# Patient Record
Sex: MALE | Race: WHITE | NOT HISPANIC OR LATINO | Employment: UNEMPLOYED | URBAN - METROPOLITAN AREA
[De-identification: names, ages, dates, MRNs, and addresses within clinical notes are randomized per-mention and may not be internally consistent; named-entity substitution may affect disease eponyms.]

---

## 2017-10-30 ENCOUNTER — HOSPITAL ENCOUNTER (INPATIENT)
Facility: HOSPITAL | Age: 47
LOS: 4 days | Discharge: OTHER INSTITUTION | DRG: 897 | End: 2017-11-03
Attending: EMERGENCY MEDICINE | Admitting: INTERNAL MEDICINE
Payer: COMMERCIAL

## 2017-10-30 DIAGNOSIS — F99 PSYCHIATRIC DISORDER: ICD-10-CM

## 2017-10-30 DIAGNOSIS — F10.239 ALCOHOL WITHDRAWAL (HCC): Primary | ICD-10-CM

## 2017-10-30 DIAGNOSIS — R56.9 WITHDRAWAL SEIZURES (HCC): ICD-10-CM

## 2017-10-30 DIAGNOSIS — F19.239 WITHDRAWAL SEIZURES (HCC): ICD-10-CM

## 2017-10-30 PROBLEM — Z92.89 S/P ALCOHOL DETOXIFICATION: Status: ACTIVE | Noted: 2017-10-30

## 2017-10-30 LAB
ALBUMIN SERPL BCP-MCNC: 3.9 G/DL (ref 3.5–5)
ALP SERPL-CCNC: 48 U/L (ref 46–116)
ALT SERPL W P-5'-P-CCNC: 43 U/L (ref 12–78)
ANION GAP SERPL CALCULATED.3IONS-SCNC: 11 MMOL/L (ref 4–13)
AST SERPL W P-5'-P-CCNC: 38 U/L (ref 5–45)
BASOPHILS # BLD AUTO: 0.02 THOUSANDS/ΜL (ref 0–0.1)
BASOPHILS NFR BLD AUTO: 1 % (ref 0–1)
BILIRUB SERPL-MCNC: 0.3 MG/DL (ref 0.2–1)
BUN SERPL-MCNC: 8 MG/DL (ref 5–25)
CALCIUM SERPL-MCNC: 8.4 MG/DL (ref 8.3–10.1)
CHLORIDE SERPL-SCNC: 107 MMOL/L (ref 100–108)
CO2 SERPL-SCNC: 24 MMOL/L (ref 21–32)
CREAT SERPL-MCNC: 0.69 MG/DL (ref 0.6–1.3)
EOSINOPHIL # BLD AUTO: 0.04 THOUSAND/ΜL (ref 0–0.61)
EOSINOPHIL NFR BLD AUTO: 1 % (ref 0–6)
ERYTHROCYTE [DISTWIDTH] IN BLOOD BY AUTOMATED COUNT: 14.3 % (ref 11.6–15.1)
ETHANOL EXG-MCNC: 0.14 MG/DL
ETHANOL SERPL-MCNC: 348 MG/DL (ref 0–3)
GFR SERPL CREATININE-BSD FRML MDRD: 112 ML/MIN/1.73SQ M
GLUCOSE SERPL-MCNC: 158 MG/DL (ref 65–140)
HCT VFR BLD AUTO: 41.2 % (ref 36.5–49.3)
HGB BLD-MCNC: 14.5 G/DL (ref 12–17)
LYMPHOCYTES # BLD AUTO: 0.87 THOUSANDS/ΜL (ref 0.6–4.47)
LYMPHOCYTES NFR BLD AUTO: 24 % (ref 14–44)
MAGNESIUM SERPL-MCNC: 2.1 MG/DL (ref 1.6–2.6)
MCH RBC QN AUTO: 30.9 PG (ref 26.8–34.3)
MCHC RBC AUTO-ENTMCNC: 35.2 G/DL (ref 31.4–37.4)
MCV RBC AUTO: 88 FL (ref 82–98)
MONOCYTES # BLD AUTO: 0.47 THOUSAND/ΜL (ref 0.17–1.22)
MONOCYTES NFR BLD AUTO: 13 % (ref 4–12)
NEUTROPHILS # BLD AUTO: 2.24 THOUSANDS/ΜL (ref 1.85–7.62)
NEUTS SEG NFR BLD AUTO: 61 % (ref 43–75)
PLATELET # BLD AUTO: 111 THOUSANDS/UL (ref 149–390)
PMV BLD AUTO: 8.9 FL (ref 8.9–12.7)
POTASSIUM SERPL-SCNC: 3.3 MMOL/L (ref 3.5–5.3)
PROT SERPL-MCNC: 7.2 G/DL (ref 6.4–8.2)
RBC # BLD AUTO: 4.69 MILLION/UL (ref 3.88–5.62)
SODIUM SERPL-SCNC: 142 MMOL/L (ref 136–145)
WBC # BLD AUTO: 3.64 THOUSAND/UL (ref 4.31–10.16)

## 2017-10-30 PROCEDURE — 36415 COLL VENOUS BLD VENIPUNCTURE: CPT | Performed by: PHYSICIAN ASSISTANT

## 2017-10-30 PROCEDURE — 96360 HYDRATION IV INFUSION INIT: CPT

## 2017-10-30 PROCEDURE — 80320 DRUG SCREEN QUANTALCOHOLS: CPT | Performed by: PHYSICIAN ASSISTANT

## 2017-10-30 PROCEDURE — 80053 COMPREHEN METABOLIC PANEL: CPT | Performed by: PHYSICIAN ASSISTANT

## 2017-10-30 PROCEDURE — 96361 HYDRATE IV INFUSION ADD-ON: CPT

## 2017-10-30 PROCEDURE — 83735 ASSAY OF MAGNESIUM: CPT | Performed by: PHYSICIAN ASSISTANT

## 2017-10-30 PROCEDURE — 82075 ASSAY OF BREATH ETHANOL: CPT | Performed by: PHYSICIAN ASSISTANT

## 2017-10-30 PROCEDURE — 85025 COMPLETE CBC W/AUTO DIFF WBC: CPT | Performed by: PHYSICIAN ASSISTANT

## 2017-10-30 RX ORDER — CHLORDIAZEPOXIDE HYDROCHLORIDE 25 MG/1
25 CAPSULE, GELATIN COATED ORAL ONCE
Status: COMPLETED | OUTPATIENT
Start: 2017-10-30 | End: 2017-10-30

## 2017-10-30 RX ORDER — GABAPENTIN 100 MG/1
100 CAPSULE ORAL DAILY
COMMUNITY
End: 2018-06-16 | Stop reason: ALTCHOICE

## 2017-10-30 RX ORDER — TRAZODONE HYDROCHLORIDE 50 MG/1
50 TABLET ORAL
COMMUNITY
End: 2018-06-16 | Stop reason: ALTCHOICE

## 2017-10-30 RX ORDER — TOPIRAMATE 50 MG/1
25 TABLET, FILM COATED ORAL 2 TIMES DAILY
COMMUNITY
End: 2018-06-16 | Stop reason: ALTCHOICE

## 2017-10-30 RX ADMIN — CHLORDIAZEPOXIDE HYDROCHLORIDE 25 MG: 25 CAPSULE ORAL at 16:26

## 2017-10-30 RX ADMIN — SODIUM CHLORIDE 1000 ML: 0.9 INJECTION, SOLUTION INTRAVENOUS at 16:48

## 2017-10-30 NOTE — ED PROVIDER NOTES
History  Chief Complaint   Patient presents with    Detox Evaluation     Patient presents to the ED requesting tx for alcohol withdraw  States that he drinks a 5th of whiskey daily x28 years  Has been in treatment 3 times and has a hx of alcohol withdraw seizure  States that he has been alcohol free x7 hours  Denies SI or HI at this time     48 yo male presents for evaluation for alcohol withdraw  Patient has a long history of alcohol abuse  He has been admitted in the past for detox  He is now here wishing to do the same  He has been going through a lot with his life and states that he couldn't handle it all  He started drinking again over a week ago  He states he drinks a half a fifth of whiskey daily  Last drink over 24 hours ago (per nurse it was 7 hours ago?)  He actually is feeling ok at this time  Denies any symptoms such as headache, nausea, dizziness, lightheadedness, chest pain, SOB, abd pain, diarrhea, urinary symptoms  He does report a history of seizures secondary to withdraw  Denies any delusions or hallucinations  He denies any IV drug use, alcohol abuse  Prior to Admission Medications   Prescriptions Last Dose Informant Patient Reported? Taking?   gabapentin (NEURONTIN) 100 mg capsule   Yes Yes   Sig: Take 100 mg by mouth daily   sertraline (ZOLOFT) 50 mg tablet   Yes Yes   Sig: Take 50 mg by mouth daily   topiramate (TOPAMAX) 50 MG tablet   Yes Yes   Sig: Take 25 mg by mouth 2 (two) times a day   traZODone (DESYREL) 50 mg tablet   Yes Yes   Sig: Take 50 mg by mouth daily at bedtime      Facility-Administered Medications: None       Past Medical History:   Diagnosis Date    Alcohol abuse     Alcohol withdrawal seizure ([address] )     Insomnia     Psychiatric disorder        Past Surgical History:   Procedure Laterality Date    SKIN GRAFT      WISDOM TOOTH EXTRACTION         History reviewed  No pertinent family history    I have reviewed and agree with the history as documented  Social History   Substance Use Topics    Smoking status: Never Smoker    Smokeless tobacco: Never Used    Alcohol use Yes      Comment: 5th omarey daily         Review of Systems   Constitutional: Negative for activity change, appetite change, chills, fatigue and fever  HENT: Negative for congestion, postnasal drip, rhinorrhea, sore throat, trouble swallowing and voice change  Eyes: Negative for photophobia and visual disturbance  Respiratory: Negative for cough and shortness of breath  Cardiovascular: Negative for chest pain  Gastrointestinal: Negative for abdominal pain, constipation, diarrhea, nausea and vomiting  Endocrine: Negative for cold intolerance and heat intolerance  Genitourinary: Negative for dysuria, flank pain, frequency, hematuria and urgency  Musculoskeletal: Negative for back pain, gait problem, neck pain and neck stiffness  Skin: Negative for rash and wound  Allergic/Immunologic: Negative for food allergies  Neurological: Positive for tremors  Negative for dizziness, seizures, syncope, speech difficulty, weakness, light-headedness, numbness and headaches  Hematological: Negative for adenopathy  Physical Exam  ED Triage Vitals [10/30/17 1535]   Temperature Pulse Respirations Blood Pressure SpO2   98 4 °F (36 9 °C) 89 18 143/83 100 %      Temp Source Heart Rate Source Patient Position - Orthostatic VS BP Location FiO2 (%)   Temporal Monitor Sitting Right arm --      Pain Score       No Pain           Orthostatic Vital Signs  Vitals:    10/31/17 0120 10/31/17 0400 10/31/17 0611 10/31/17 0900   BP: 125/76 124/82 127/86 164/93   Pulse: 65 92 76 65   Patient Position - Orthostatic VS: Lying          Physical Exam   Constitutional: He is oriented to person, place, and time  He appears well-developed and well-nourished  No distress  HENT:   Head: Normocephalic and atraumatic     Eyes: Conjunctivae and EOM are normal  Pupils are equal, round, and reactive to light  Neck: Normal range of motion  Neck supple  Cardiovascular: Normal rate, regular rhythm, normal heart sounds and intact distal pulses  Exam reveals no gallop and no friction rub  No murmur heard  Pulmonary/Chest: Effort normal and breath sounds normal  No respiratory distress  He has no wheezes  He has no rales  Musculoskeletal: Normal range of motion  He exhibits no edema, tenderness or deformity  Neurological: He is alert and oriented to person, place, and time  He has normal strength  He displays tremor  No cranial nerve deficit or sensory deficit  GCS eye subscore is 4  GCS verbal subscore is 5  GCS motor subscore is 6  Normal pupillary response  No nystagmus   Skin: Skin is warm  He is diaphoretic  No pallor  Psychiatric: He has a normal mood and affect  His behavior is normal  Judgment and thought content normal    Vitals reviewed        ED Medications  Medications   gabapentin (NEURONTIN) capsule 100 mg (100 mg Oral Given 10/31/17 0948)   sertraline (ZOLOFT) tablet 50 mg (50 mg Oral Given 10/31/17 0949)   topiramate (TOPAMAX) tablet 25 mg (25 mg Oral Given 10/31/17 0950)   traZODone (DESYREL) tablet 50 mg (50 mg Oral Given 10/31/17 0149)   ondansetron (ZOFRAN) injection 4 mg (not administered)   acetaminophen (TYLENOL) tablet 650 mg (not administered)   multivitamin-minerals (CENTRUM) tablet 1 tablet (1 tablet Oral Given 49/98/57 4927)   folic acid (FOLVITE) tablet 1 mg (1 mg Oral Given 10/31/17 0948)   chlordiazePOXIDE (LIBRIUM) capsule 25 mg (25 mg Oral Given 10/31/17 0608)   sodium chloride 0 9 % infusion (125 mL/hr Intravenous New Bag 10/31/17 0943)   thiamine (VITAMIN B1) 100 mg in sodium chloride 0 9 % 50 mL IVPB (not administered)   LORazepam (ATIVAN) 2 mg/mL injection 2 mg (2 mg Intravenous Given 10/31/17 0954)   potassium chloride (K-DUR,KLOR-CON) CR tablet 40 mEq (40 mEq Oral Given 10/31/17 1005)   heparin (porcine) subcutaneous injection 5,000 Units (not administered)   sodium chloride 0 9 % bolus 1,000 mL (0 mL Intravenous Stopped 10/31/17 0047)   chlordiazePOXIDE (LIBRIUM) capsule 25 mg (25 mg Oral Given 10/30/17 1626)   potassium chloride (K-DUR,KLOR-CON) CR tablet 40 mEq (40 mEq Oral Given 10/31/17 0300)       Diagnostic Studies  Results Reviewed     Procedure Component Value Units Date/Time    POCT alcohol breath test [[id number]]  (Normal) Resulted:  10/30/17 2150    Lab Status:  Final result Updated:  10/30/17 2150     EXTBreath Alcohol 0 143    Ethanol [[id number]]  (Abnormal) Collected:  10/30/17 1640    Lab Status:  Final result Specimen:  Blood from Arm, Left Updated:  10/30/17 1728     Ethanol Lvl 348 (H) mg/dL     Comprehensive metabolic panel [[id number]]  (Abnormal) Collected:  10/30/17 1640    Lab Status:  Final result Specimen:  Blood from Arm, Left Updated:  10/30/17 1719     Sodium 142 mmol/L      Potassium 3 3 (L) mmol/L      Chloride 107 mmol/L      CO2 24 mmol/L      Anion Gap 11 mmol/L      BUN 8 mg/dL      Creatinine 0 69 mg/dL      Glucose 158 (H) mg/dL      Calcium 8 4 mg/dL      AST 38 U/L      ALT 43 U/L      Alkaline Phosphatase 48 U/L      Total Protein 7 2 g/dL      Albumin 3 9 g/dL      Total Bilirubin 0 30 mg/dL      eGFR 112 ml/min/1 73sq m     Narrative:         National Kidney Disease Education Program recommendations are as follows:  GFR calculation is accurate only with a steady state creatinine  Chronic Kidney disease less than 60 ml/min/1 73 sq  meters  Kidney failure less than 15 ml/min/1 73 sq  meters      Magnesium [[id number]]  (Normal) Collected:  10/30/17 1640    Lab Status:  Final result Specimen:  Blood from Arm, Left Updated:  10/30/17 1719     Magnesium 2 1 mg/dL     CBC and differential [[id number]]  (Abnormal) Collected:  10/30/17 1640    Lab Status:  Final result Specimen:  Blood from Arm, Left Updated:  10/30/17 1702     WBC 3 64 (L) Thousand/uL      RBC 4 69 Million/uL      Hemoglobin 14 5 g/dL      Hematocrit 41 2 % MCV 88 fL      MCH 30 9 pg      MCHC 35 2 g/dL      RDW 14 3 %      MPV 8 9 fL      Platelets 184 (L) Thousands/uL      Neutrophils Relative 61 %      Lymphocytes Relative 24 %      Monocytes Relative 13 (H) %      Eosinophils Relative 1 %      Basophils Relative 1 %      Neutrophils Absolute 2 24 Thousands/µL      Lymphocytes Absolute 0 87 Thousands/µL      Monocytes Absolute 0 47 Thousand/µL      Eosinophils Absolute 0 04 Thousand/µL      Basophils Absolute 0 02 Thousands/µL                  No orders to display              Procedures  Procedures       Phone Contacts  ED Phone Contact    ED Course  ED Course as of Oct 31 1038   Mon Oct 30, 2017   1747 MEDICAL ALCOHOL: (!) 348   1811 Needs to have recheck at 23:00 MEDICAL ALCOHOL: (!) 348                               MDM  Number of Diagnoses or Management Options  Alcohol withdrawal ([place] [address] ): Withdrawal seizures [facility]):   Diagnosis management comments: At this time patient presenting for desire to withdrawal from alcohol  He is currently intoxicated  I will obtain a routine workup and monitor the patient until he has reached sobriety and at that time if he is still wanting to detox the plan is to admit due to history of seizures  ** Pt signed out to Dr Sukumar Yip at 22:30 on 10/30/17   Pending sobriety        Amount and/or Complexity of Data Reviewed  Clinical lab tests: ordered and reviewed  Review and summarize past medical records: yes  Discuss the patient with other providers: yes  Independent visualization of images, tracings, or specimens: yes      CritCare Time    Disposition  Final diagnoses:   Alcohol withdrawal ([place] [address] )   Withdrawal seizures ([place] [address] ) - History of withdrawal seizures     Time reflects when diagnosis was documented in both MDM as applicable and the Disposition within this note     Time User Action Codes Description Comment    10/30/2017 10:38 PM Akilah Og Add [F10 239] Alcohol withdrawal ([facility] )     10/30/2017 10:39 PM Akilah Og Add [U74 332,  R56 9] Withdrawal seizures ([facility] [address] )     10/30/2017 10:39 PM Othelia Nails Modify [V24 092,  R56 9] Withdrawal seizures ([facility] ) History of withdrawal seizures    10/31/2017  7:03 AM Ayush Pierson Add [F99] Psychiatric disorder       ED Disposition     ED Disposition Condition Comment    Admit  Case was discussed with Clayton Perez and the patient's admission status was agreed to be Admission Status: inpatient status to the service of Dr Rik Houston          Follow-up Information    None       Current Discharge Medication List      CONTINUE these medications which have NOT CHANGED    Details   gabapentin (NEURONTIN) 100 mg capsule Take 100 mg by mouth daily      sertraline (ZOLOFT) 50 mg tablet Take 50 mg by mouth daily      topiramate (TOPAMAX) 50 MG tablet Take 25 mg by mouth 2 (two) times a day      traZODone (DESYREL) 50 mg tablet Take 50 mg by mouth daily at bedtime           No discharge procedures on file      ED Provider  Electronically Signed by           Chantel Plaza PA-C  10/31/17 1038

## 2017-10-31 LAB
ANION GAP SERPL CALCULATED.3IONS-SCNC: 12 MMOL/L (ref 4–13)
BUN SERPL-MCNC: 8 MG/DL (ref 5–25)
CALCIUM SERPL-MCNC: 8 MG/DL (ref 8.3–10.1)
CHLORIDE SERPL-SCNC: 103 MMOL/L (ref 100–108)
CO2 SERPL-SCNC: 24 MMOL/L (ref 21–32)
CREAT SERPL-MCNC: 0.68 MG/DL (ref 0.6–1.3)
ERYTHROCYTE [DISTWIDTH] IN BLOOD BY AUTOMATED COUNT: 14.6 % (ref 11.6–15.1)
GFR SERPL CREATININE-BSD FRML MDRD: 113 ML/MIN/1.73SQ M
GLUCOSE SERPL-MCNC: 109 MG/DL (ref 65–140)
HCT VFR BLD AUTO: 39.4 % (ref 36.5–49.3)
HGB BLD-MCNC: 13.5 G/DL (ref 12–17)
MCH RBC QN AUTO: 30.1 PG (ref 26.8–34.3)
MCHC RBC AUTO-ENTMCNC: 34.3 G/DL (ref 31.4–37.4)
MCV RBC AUTO: 88 FL (ref 82–98)
PLATELET # BLD AUTO: 100 THOUSANDS/UL (ref 149–390)
PMV BLD AUTO: 9.2 FL (ref 8.9–12.7)
POTASSIUM SERPL-SCNC: 3.3 MMOL/L (ref 3.5–5.3)
RBC # BLD AUTO: 4.48 MILLION/UL (ref 3.88–5.62)
SODIUM SERPL-SCNC: 139 MMOL/L (ref 136–145)
WBC # BLD AUTO: 2.69 THOUSAND/UL (ref 4.31–10.16)

## 2017-10-31 PROCEDURE — 99285 EMERGENCY DEPT VISIT HI MDM: CPT

## 2017-10-31 PROCEDURE — 80048 BASIC METABOLIC PNL TOTAL CA: CPT | Performed by: NURSE PRACTITIONER

## 2017-10-31 PROCEDURE — 85027 COMPLETE CBC AUTOMATED: CPT | Performed by: NURSE PRACTITIONER

## 2017-10-31 RX ORDER — LORAZEPAM 2 MG/ML
2 INJECTION INTRAMUSCULAR EVERY 2 HOUR PRN
Status: DISCONTINUED | OUTPATIENT
Start: 2017-10-31 | End: 2017-11-03 | Stop reason: HOSPADM

## 2017-10-31 RX ORDER — TOPIRAMATE 25 MG/1
25 TABLET ORAL 2 TIMES DAILY
Status: DISCONTINUED | OUTPATIENT
Start: 2017-10-31 | End: 2017-11-03 | Stop reason: HOSPADM

## 2017-10-31 RX ORDER — CHLORDIAZEPOXIDE HYDROCHLORIDE 25 MG/1
25 CAPSULE, GELATIN COATED ORAL EVERY 6 HOURS SCHEDULED
Status: DISCONTINUED | OUTPATIENT
Start: 2017-10-31 | End: 2017-11-02

## 2017-10-31 RX ORDER — SODIUM CHLORIDE 9 MG/ML
75 INJECTION, SOLUTION INTRAVENOUS CONTINUOUS
Status: DISCONTINUED | OUTPATIENT
Start: 2017-10-31 | End: 2017-11-03

## 2017-10-31 RX ORDER — FOLIC ACID 1 MG/1
1 TABLET ORAL DAILY
Status: DISCONTINUED | OUTPATIENT
Start: 2017-10-31 | End: 2017-11-03 | Stop reason: HOSPADM

## 2017-10-31 RX ORDER — HEPARIN SODIUM 5000 [USP'U]/ML
5000 INJECTION, SOLUTION INTRAVENOUS; SUBCUTANEOUS EVERY 8 HOURS SCHEDULED
Status: DISCONTINUED | OUTPATIENT
Start: 2017-10-31 | End: 2017-11-03 | Stop reason: HOSPADM

## 2017-10-31 RX ORDER — LORAZEPAM 2 MG/ML
2 INJECTION INTRAMUSCULAR EVERY 6 HOURS PRN
Status: DISCONTINUED | OUTPATIENT
Start: 2017-10-31 | End: 2017-10-31

## 2017-10-31 RX ORDER — POTASSIUM CHLORIDE 20 MEQ/1
40 TABLET, EXTENDED RELEASE ORAL EVERY 4 HOURS
Status: COMPLETED | OUTPATIENT
Start: 2017-10-31 | End: 2017-10-31

## 2017-10-31 RX ORDER — TRAZODONE HYDROCHLORIDE 50 MG/1
50 TABLET ORAL
Status: DISCONTINUED | OUTPATIENT
Start: 2017-10-31 | End: 2017-11-03 | Stop reason: HOSPADM

## 2017-10-31 RX ORDER — GABAPENTIN 100 MG/1
100 CAPSULE ORAL DAILY
Status: DISCONTINUED | OUTPATIENT
Start: 2017-10-31 | End: 2017-11-03 | Stop reason: HOSPADM

## 2017-10-31 RX ORDER — POTASSIUM CHLORIDE 20 MEQ/1
40 TABLET, EXTENDED RELEASE ORAL ONCE
Status: COMPLETED | OUTPATIENT
Start: 2017-10-31 | End: 2017-10-31

## 2017-10-31 RX ORDER — ACETAMINOPHEN 325 MG/1
650 TABLET ORAL EVERY 6 HOURS PRN
Status: DISCONTINUED | OUTPATIENT
Start: 2017-10-31 | End: 2017-11-03 | Stop reason: HOSPADM

## 2017-10-31 RX ORDER — ONDANSETRON 2 MG/ML
4 INJECTION INTRAMUSCULAR; INTRAVENOUS EVERY 6 HOURS PRN
Status: DISCONTINUED | OUTPATIENT
Start: 2017-10-31 | End: 2017-11-03 | Stop reason: HOSPADM

## 2017-10-31 RX ORDER — THIAMINE MONONITRATE (VIT B1) 100 MG
100 TABLET ORAL DAILY
Status: DISCONTINUED | OUTPATIENT
Start: 2017-10-31 | End: 2017-10-31

## 2017-10-31 RX ADMIN — TRAZODONE HYDROCHLORIDE 50 MG: 50 TABLET ORAL at 21:37

## 2017-10-31 RX ADMIN — SODIUM CHLORIDE 125 ML/HR: 0.9 INJECTION, SOLUTION INTRAVENOUS at 09:43

## 2017-10-31 RX ADMIN — POTASSIUM CHLORIDE 40 MEQ: 1500 TABLET, EXTENDED RELEASE ORAL at 03:00

## 2017-10-31 RX ADMIN — LORAZEPAM 2 MG: 2 INJECTION, SOLUTION INTRAMUSCULAR; INTRAVENOUS at 15:12

## 2017-10-31 RX ADMIN — TRAZODONE HYDROCHLORIDE 50 MG: 50 TABLET ORAL at 01:49

## 2017-10-31 RX ADMIN — Medication 1 TABLET: at 09:46

## 2017-10-31 RX ADMIN — LORAZEPAM 2 MG: 2 INJECTION, SOLUTION INTRAMUSCULAR; INTRAVENOUS at 21:37

## 2017-10-31 RX ADMIN — POTASSIUM CHLORIDE 40 MEQ: 1500 TABLET, EXTENDED RELEASE ORAL at 10:05

## 2017-10-31 RX ADMIN — TOPIRAMATE 25 MG: 25 TABLET, FILM COATED ORAL at 17:43

## 2017-10-31 RX ADMIN — TOPIRAMATE 25 MG: 25 TABLET, FILM COATED ORAL at 09:50

## 2017-10-31 RX ADMIN — THIAMINE HYDROCHLORIDE 100 MG: 100 INJECTION, SOLUTION INTRAMUSCULAR; INTRAVENOUS at 11:25

## 2017-10-31 RX ADMIN — SERTRALINE HYDROCHLORIDE 50 MG: 50 TABLET ORAL at 09:49

## 2017-10-31 RX ADMIN — CHLORDIAZEPOXIDE HYDROCHLORIDE 25 MG: 25 CAPSULE ORAL at 06:08

## 2017-10-31 RX ADMIN — POTASSIUM CHLORIDE 40 MEQ: 1500 TABLET, EXTENDED RELEASE ORAL at 13:16

## 2017-10-31 RX ADMIN — CHLORDIAZEPOXIDE HYDROCHLORIDE 25 MG: 25 CAPSULE ORAL at 11:26

## 2017-10-31 RX ADMIN — HEPARIN SODIUM 5000 UNITS: 5000 INJECTION, SOLUTION INTRAVENOUS; SUBCUTANEOUS at 13:16

## 2017-10-31 RX ADMIN — LORAZEPAM 2 MG: 2 INJECTION, SOLUTION INTRAMUSCULAR; INTRAVENOUS at 09:54

## 2017-10-31 RX ADMIN — GABAPENTIN 100 MG: 100 CAPSULE ORAL at 09:48

## 2017-10-31 RX ADMIN — FOLIC ACID 1 MG: 1 TABLET ORAL at 09:48

## 2017-10-31 RX ADMIN — SODIUM CHLORIDE 125 ML/HR: 0.9 INJECTION, SOLUTION INTRAVENOUS at 17:34

## 2017-10-31 RX ADMIN — HEPARIN SODIUM 5000 UNITS: 5000 INJECTION, SOLUTION INTRAVENOUS; SUBCUTANEOUS at 21:37

## 2017-10-31 RX ADMIN — CHLORDIAZEPOXIDE HYDROCHLORIDE 25 MG: 25 CAPSULE ORAL at 01:49

## 2017-10-31 RX ADMIN — CHLORDIAZEPOXIDE HYDROCHLORIDE 25 MG: 25 CAPSULE ORAL at 17:43

## 2017-10-31 NOTE — PLAN OF CARE
DISCHARGE PLANNING     Discharge to home or other facility with appropriate resources Progressing        INFECTION - ADULT     Absence or prevention of progression during hospitalization Progressing        Knowledge Deficit     Patient/family/caregiver demonstrates understanding of disease process, treatment plan, medications, and discharge instructions Progressing        METABOLIC, FLUID AND ELECTROLYTES - ADULT     Electrolytes maintained within normal limits Progressing     Fluid balance maintained Progressing        NEUROSENSORY - ADULT     Achieves stable or improved neurological status Progressing     Absence of seizures Progressing     Remains free of injury related to seizures activity Progressing     Achieves maximal functionality and self care Progressing        PAIN - ADULT     Verbalizes/displays adequate comfort level or baseline comfort level Progressing        SAFETY ADULT     Patient will remain free of falls Progressing     Maintain or return to baseline ADL function Progressing     Maintain or return mobility status to optimal level Progressing

## 2017-10-31 NOTE — SOCIAL WORK
Met with patient, discussed role of Care Management  Patient reports being recently fired from his job due to his drinking  A PFA was files against him on Thursday, Oct 26, 2017 and he can not go near his wife or 4 children (ages 13-11)  He stayed 2 nights with a friend from work and was sharing a couch with someone  He stayed one night at a cousin's and can not go back there as he has young children  He is now homeless  He has been to inpatient alcohol rehab in the past, most recent a 30 day stay at [facility] in [facility] and was sober for 75 days before he started to drink a fifth of whiskey a day  He sees  Андрей Gregg at [facility] at [phone] and he thought Shayan Crawford was trying to get him into [facility] re[facility] an [place]  Placed call Gilbert and mari message re above  Will follow  Not sure if patient will have insurance as of 11/1/17 since his [id number]  [facility]  insurance was through his job and as of now he did not sign onto [employer] for insurance to continue  Tessa Camejo

## 2017-10-31 NOTE — H&P
History and Physical - [facility]    Patient Information: Lord Salas 49 y o  male MRN: [id number]  Unit/Bed#: ED 08 Encounter: [id number]  Admitting Physician: STEPHANIE Boudreaux  PCP: Maggie Fischer DO  Date of Admission:  10/31/17    Assessment/Plan:    Hospital Problem List:     Principal Problem:    S/P alcohol detoxification  Active Problems:    Psychiatric disorder    Alcohol withdrawal seizure ([facility] )    Alcohol abuse      Plan for the Primary Problem(s):  · S/P Alcohol detox with history of alcohol withdrawal seizure:  · Initiate CIWA protocol  · Librium 25 mg q 6 hours and Ativan 2 mg IV p r n  · Thiamine, folic acid, and multivitamin  · Continuous cardiopulmonary monitoring  · Consult case management for possible alcohol rehab at time of discharge    Plan for Additional Problems:   · Psychiatric disorder:  Continue sertraline, Neurontin, and trazodone  VTE Prophylaxis: Patient is low risk for VT  / sequential compression device   Code Status:  Full code  POLST: There is no POLST form on file for this patient (pre-hospital)    Anticipated Length of Stay:  Patient will be admitted on an Inpatient basis with an anticipated length of stay of  > 2 midnights  Justification for Hospital Stay:  IV medication    Total Time for Visit, including Counseling / Coordination of Care: 30 minutes  Greater than 50% of this total time spent on direct patient counseling and coordination of care  Chief Complaint:   Alcohol detox     History of Present Illness:    Lord Salas is a 49 y o  male with history of alcohol abuse and alcohol withdrawal seizures who presents with request for treatment for alcohol withdrawal   Patient states that he got out of drug and alcohol rehab around July 4th  He was sober for 75 days and had been going to [facility] twice a day  Patient states that his wife drinks and takes Xanax and from being around her he relapsed    Patient states he has been drinking approximately 1/5 of whiskey per day for the past 3 weeks with his last drink being Monday morning  Patient admits to history of withdrawal seizures  Denies fevers chills  No nausea, vomiting, or diarrhea  No lightheadedness or dizziness  Denies delusions or hallucinations  Review of Systems:    Review of Systems   Constitutional: Positive for activity change  Negative for appetite change, chills and fever  Respiratory: Negative for apnea, cough and shortness of breath  Cardiovascular: Negative for chest pain, palpitations and leg swelling  Gastrointestinal: Negative for abdominal distention, abdominal pain, constipation, diarrhea, nausea and vomiting  Genitourinary: Negative for dysuria  Neurological: Positive for tremors  Negative for dizziness, seizures, facial asymmetry, weakness, light-headedness, numbness and headaches  Psychiatric/Behavioral: Negative for agitation and confusion  The patient is not nervous/anxious  All other systems reviewed and are negative  Past Medical and Surgical History:     Past Medical History:   Diagnosis Date    Alcohol abuse     Alcohol withdrawal seizure ([facility] [address] )     Insomnia     Psychiatric disorder        Past Surgical History:   Procedure Laterality Date    SKIN GRAFT      WISDOM TOOTH EXTRACTION         Meds/Allergies:    Prior to Admission medications    Medication Sig Start Date End Date Taking? Authorizing Provider   gabapentin (NEURONTIN) 100 mg capsule Take 100 mg by mouth daily   Yes Historical Provider, MD   sertraline (ZOLOFT) 50 mg tablet Take 50 mg by mouth daily   Yes Historical Provider, MD   topiramate (TOPAMAX) 50 MG tablet Take 25 mg by mouth 2 (two) times a day   Yes Historical Provider, MD   traZODone (DESYREL) 50 mg tablet Take 50 mg by mouth daily at bedtime   Yes Historical Provider, MD     I have reviewed home medications with patient personally      Allergies: No Known Allergies    Social History:     Marital Status: /Civil Union   Occupation:  Unemployed  Patient Pre-hospital Living Situation:  Living in car  Patient Pre-hospital Level of Mobility:  Independent  Patient Pre-hospital Diet Restrictions:  None  Substance Use History:   History   Alcohol Use    Yes     Comment: 5th whiskey daily      History   Smoking Status    Never Smoker   Smokeless Tobacco    Never Used     History   Drug Use No       Family History:    non-contributory    Physical Exam:     Vitals:   Blood Pressure: 101/53 (10/30/17 2200)  Pulse: 62 (10/30/17 2200)  Temperature: 98 4 °F (36 9 °C) (10/30/17 1535)  Temp Source: Temporal (10/30/17 1535)  Respirations: 15 (10/30/17 2200)  Height: 5' 10" (177 8 cm) (10/30/17 1535)  Weight - Scale: 88 kg (194 lb) (10/30/17 1535)  SpO2: 95 % (10/30/17 2200)    Physical Exam   Constitutional: He is oriented to person, place, and time  He appears well-developed and well-nourished  No distress  HENT:   Head: Normocephalic and atraumatic  Eyes: EOM are normal  Pupils are equal, round, and reactive to light  Neck: Normal range of motion  Neck supple  Cardiovascular: Normal rate, regular rhythm, normal heart sounds and intact distal pulses  Exam reveals no gallop and no friction rub  No murmur heard  Pulmonary/Chest: Effort normal and breath sounds normal  No respiratory distress  He has no wheezes  He has no rales  Abdominal: Soft  Bowel sounds are normal  He exhibits no distension  There is no tenderness  Musculoskeletal: Normal range of motion  He exhibits no edema  Neurological: He is alert and oriented to person, place, and time  Skin: Skin is warm and dry  Psychiatric: He has a normal mood and affect  Judgment normal    Nursing note and vitals reviewed  Additional Data:     Lab Results: I have personally reviewed pertinent reports          Results from last 7 days  Lab Units 10/30/17  1640   WBC Thousand/uL 3 64*   HEMOGLOBIN g/dL 14 5   HEMATOCRIT % 41 2   PLATELETS Thousands/uL 111*   NEUTROS PCT % 61   LYMPHS PCT % 24   MONOS PCT % 13*   EOS PCT % 1       Results from last 7 days  Lab Units 10/30/17  1640   SODIUM mmol/L 142   POTASSIUM mmol/L 3 3*   CHLORIDE mmol/L 107   CO2 mmol/L 24   BUN mg/dL 8   CREATININE mg/dL 0 69   CALCIUM mg/dL 8 4   TOTAL PROTEIN g/dL 7 2   BILIRUBIN TOTAL mg/dL 0 30   ALK PHOS U/L 48   ALT U/L 43   AST U/L 38   GLUCOSE RANDOM mg/dL 158*             Allscripts Records Reviewed: No     ** Please Note: Dragon 360 Dictation voice to text software may have been used in the creation of this document   **

## 2017-10-31 NOTE — ED CARE HANDOFF
Emergency Department Sign Out Note        Sign out and transfer of care from [facility]  See Separate Emergency Department note  The patient, Carolina Reyes, was evaluated by the previous provider for alcohol intoxication, hx of w/d seizures and desire for rehab  Workup Completed:  Labs Reviewed   CBC AND DIFFERENTIAL - Abnormal        Result Value Ref Range Status    WBC 3 64 (*) 4 31 - 10 16 Thousand/uL Final    Platelets 918 (*) 851 - 390 Thousands/uL Final    Monocytes Relative 13 (*) 4 - 12 % Final    RBC 4 69  3 88 - 5 62 Million/uL Final    Hemoglobin 14 5  12 0 - 17 0 g/dL Final    Hematocrit 41 2  36 5 - 49 3 % Final    MCV 88  82 - 98 fL Final    MCH 30 9  26 8 - 34 3 pg Final    MCHC 35 2  31 4 - 37 4 g/dL Final    RDW 14 3  11 6 - 15 1 % Final    MPV 8 9  8 9 - 12 7 fL Final    Neutrophils Relative 61  43 - 75 % Final    Lymphocytes Relative 24  14 - 44 % Final    Eosinophils Relative 1  0 - 6 % Final    Basophils Relative 1  0 - 1 % Final    Neutrophils Absolute 2 24  1 85 - 7 62 Thousands/µL Final    Lymphocytes Absolute 0 87  0 60 - 4 47 Thousands/µL Final    Monocytes Absolute 0 47  0 17 - 1 22 Thousand/µL Final    Eosinophils Absolute 0 04  0 00 - 0 61 Thousand/µL Final    Basophils Absolute 0 02  0 00 - 0 10 Thousands/µL Final   COMPREHENSIVE METABOLIC PANEL - Abnormal     Potassium 3 3 (*) 3 5 - 5 3 mmol/L Final    Glucose 158 (*) 65 - 140 mg/dL Final    Comment:   If the patient is fasting, the ADA then defines impaired fasting glucose as > 100 mg/dL and diabetes as > or equal to 123 mg/dL  Specimen collection should occur prior to Sulfasalazine administration due to the potential for falsely depressed results  Specimen collection should occur prior to Sulfapyridine administration due to the potential for falsely elevated results      Sodium 142  136 - 145 mmol/L Final    Chloride 107  100 - 108 mmol/L Final    CO2 24  21 - 32 mmol/L Final    Anion Gap 11  4 - 13 mmol/L Final    BUN 8  5 - 25 mg/dL Final    Creatinine 0 69  0 60 - 1 30 mg/dL Final    Comment: Standardized to IDMS reference method    Calcium 8 4  8 3 - 10 1 mg/dL Final    AST 38  5 - 45 U/L Final    Comment:   Specimen collection should occur prior to Sulfasalazine administration due to the potential for falsely depressed results  ALT 43  12 - 78 U/L Final    Comment:   Specimen collection should occur prior to Sulfasalazine administration due to the potential for falsely depressed results  Alkaline Phosphatase 48  46 - 116 U/L Final    Total Protein 7 2  6 4 - 8 2 g/dL Final    Albumin 3 9  3 5 - 5 0 g/dL Final    Total Bilirubin 0 30  0 20 - 1 00 mg/dL Final    eGFR 112  ml/min/1 73sq m Final    Narrative:     National Kidney Disease Education Program recommendations are as follows:  GFR calculation is accurate only with a steady state creatinine  Chronic Kidney disease less than 60 ml/min/1 73 sq  meters  Kidney failure less than 15 ml/min/1 73 sq  meters  MEDICAL ALCOHOL - Abnormal     Ethanol Lvl 348 (*) 0 - 3 mg/dL Final    Comment: Diluted  VMA   MAGNESIUM - Normal    Magnesium 2 1  1 6 - 2 6 mg/dL Final   POCT ALCOHOL BREATH TEST - Normal    EXTBreath Alcohol 0 143   Final         ED Course / Workup Pending (followup):  Pt already tremulous at BAT of 143  Hx of w/d sz in the past - concerned for sending directly to rehab without a medical detox    Pt agreeable to admission                          ED Course      Procedures  MDM  Number of Diagnoses or Management Options  Alcohol withdrawal ([facility] ): new and requires workup  Withdrawal seizures [facility]): new and requires workup     Amount and/or Complexity of Data Reviewed  Clinical lab tests: ordered and reviewed  Discuss the patient with other providers: yes      CritCare Time      Disposition  Final diagnoses:   Alcohol withdrawal ([facility] )   Withdrawal seizures ([facility] ) - History of withdrawal seizures     Time reflects when diagnosis was documented in both MDM as applicable and the Disposition within this note     Time User Action Codes Description Comment    10/30/2017 10:38 PM Madi Quan Add [F10 239] Alcohol withdrawal ([facility] )     10/30/2017 10:39 PM Darshana Santana [U36 532,  R56 9] Withdrawal seizures ([facility] )     10/30/2017 10:39 PM Joe Rothman [F02 303,  R56 9] Withdrawal seizures ([facility] ) History of withdrawal seizures      ED Disposition     ED Disposition Condition Comment    Admit  Case was discussed with Clark Lunsford and the patient's admission status was agreed to be Admission Status: inpatient status to the service of Dr Isai Castorena          Follow-up Information    None       Current Discharge Medication List      CONTINUE these medications which have NOT CHANGED    Details   gabapentin (NEURONTIN) 100 mg capsule Take 100 mg by mouth daily      sertraline (ZOLOFT) 50 mg tablet Take 50 mg by mouth daily      topiramate (TOPAMAX) 50 MG tablet Take 25 mg by mouth 2 (two) times a day      traZODone (DESYREL) 50 mg tablet Take 50 mg by mouth daily at bedtime           No discharge procedures on file         ED Provider  Electronically Signed by

## 2017-10-31 NOTE — CONSULTS
[address] 49 y o  male MRN: [id number]  Unit/Bed#: -01 Encounter: [id number]    Assessment/Plan     Assessment:  Mood Disorder  Alcohol abuse    Plan:   1  Discharge to inpatient rehab when medically stable  2  Continue CIWA protocol  3  Continue current psychiatric medications  4  Patient does not meet criteria for inpatient psychiatric admission  Psychiatry signing off  Risks, benefits and possible side effects of Medications:   Risks, benefits, and possible side effects of medications explained to patient and patient verbalizes understanding  Chief Complaint: "I'm going through alcohol withdrawal"    History of Present Illness   Physician Requesting Consult: Amber Crowley MD  Reason for Consult / Principal Problem: "I want rehab"    Patient is a 49year old male who came to [facility] ED due to alcohol withdrawal symptoms and wanting to pursue inpatient rehab  Patient reported drinking a 5th of liquor or a 6 pack of pounders a day  Recent stressors are issues with his wife, being kicked out of the home, and becoming essentially homeless  Before admission he stopped drinking abruptly and sought medical attention due to history of withdrawal seizures  Currently has withdrawal symptoms of tremors, anxiety, and sweating  Anxiety currently less since starting Librium taper  Patient's last rehab stint was at [facility] in July where he was discharged in August   He is compliant with psychiatric medications  Currently denied SI/HI  Reports adequate sleep, appetite, energy  Denied psychosis and denied history of steve  Does not endorse manic symptoms  Psychosocial Stressors: marital, homelessness, and alcohol      Consults    Psychiatric Review Of Systems:  sleep: no  appetite changes: no  weight changes: no  energy/anergy: no  interest/pleasure/anhedonia: no  somatic symptoms: no  anxiety/panic: yes  steve: no  guilty/hopeless: no  self injurious behavior/risky behavior: no    Historical Information   Past Psychiatric History:   Denied  Currently in treatment with Outpatient psychiatrist   Past Suicide attempts: denied  Past Violent behavior: denied  Past Psychiatric medication trial: Zoloft, Trazodone, Ativan, Librium, Topamax    Substance Abuse History:  Denied  Use of Alcohol: Heavy abuse    Longest clean time: varied   History of IP/OP rehabilitation program: yes  Smoking history: denied  Use of Caffeine: occasional    Family Psychiatric History:   denied    Social History  Education: high school diploma/GED  Learning Disabilities: none  Marital history:   Living arrangement, social support: homeless  Occupational History: unemployed  Functioning Relationships: poor support system  Other Pertinent History: None    Traumatic History:   Abuse: denied  Other Traumatic Events: denied    Past Medical History:   Diagnosis Date    Alcohol abuse     Alcohol withdrawal seizure ([facility] )     Insomnia     Psychiatric disorder        Medical Review Of Systems:  Review of Systems    Meds/Allergies   all current active meds have been reviewed  No Known Allergies    Objective   Vital signs in last 24 hours:  Temp:  [97 7 °F (36 5 °C)-98 4 °F (36 9 °C)] 97 9 °F (36 6 °C)  HR:  [58-92] 63  Resp:  [0-26] 16  BP: ()/() 140/97      Intake/Output Summary (Last 24 hours) at 10/31/17 1149  Last data filed at 10/31/17 0943   Gross per 24 hour   Intake             2720 ml   Output              500 ml   Net             2220 ml       Mental Status Evaluation:  Appearance:  in hospital attire   Behavior:  cooperative   Speech:  normal pitch and normal volume   Mood:  anxious   Affect:  mood-congruent   Language: appropriate   Thought Process:  normal   Thought Content:  normal  Denied delusions/obsessions   Perceptual Disturbances: None   Risk Potential: Denied SI/HI   Potential for aggression: No   Sensorium:  person, place and time/date   Cognition:  grossly intact   Consciousness:  alert and awake    Attention: attention span and concentration were age appropriate   Intellect: within normal limits   Fund of Knowledge: awareness of current events: yes   Insight:  poor   Judgment: fair   Gait/Station: normal gait/station and normal balance   Motor Activity: bilateral hand tremors     Lab Results: reviewed  Imaging Studies: reviewed  EKG, Pathology, and Other Studies: reviewed    Code Status: Level 1 - Full Code    Isidoro Wu PA-C

## 2017-10-31 NOTE — SOCIAL WORK
Continuing to follow patient  Spoke with Iron Sanchez) counselor who is treating patient  Was informed that he had patient set up to go to [facility] on Monday  however when [facility] checked his insurance they discovered patient 's insurance was to be terminated on 10/31/17 and he had not picked up COBRA yet  Mr [employer] was also informed me even if patient secures COBRA Insurance there may be a 2-3 week lapse in insurance coverage  [facility] would NOT accept patient with out a payer source  Patient is also on the waiting list for [facility], a recovery house in [facility]  Met with patient, he informed me he heard from a  friend that his wife was working on getting COBRA for insurance coverage  Provided information on walk in intake at the [place] [address] at [facility]  and inform on [employer]  Will follow

## 2017-10-31 NOTE — CASE MANAGEMENT
Initial Clinical Review    Admission: Date/Time/Statement: 10/30/17 @ 2249     Orders Placed This Encounter   Procedures    Inpatient Admission (expected length of stay for this patient is greater than two midnights)     Standing Status:   Standing     Number of Occurrences:   1     Order Specific Question:   Admitting Physician     Answer:   Myles Pham [[id number]]     Order Specific Question:   Level of Care     Answer:   Level 1 Stepdown [13]     Order Specific Question:   Estimated length of stay     Answer:   More than 2 Midnights     Order Specific Question:   Certification     Answer:   I certify that inpatient services are medically necessary for this patient for a duration of greater than two midnights  See H&P and MD Progress Notes for additional information about the patient's course of treatment  ED: Date/Time/Mode of Arrival:   ED Arrival Information     Expected Arrival Acuity Means of Arrival Escorted By Service Admission Type    - 10/30/2017 14:49 Emergent [place] [address] Emergency    Arrival Complaint    detox          Chief Complaint:   Chief Complaint   Patient presents with    Detox Evaluation     Patient presents to the ED requesting tx for alcohol withdraw  States that he drinks a 5th of whiskey daily x28 years  Has been in treatment 3 times and has a hx of alcohol withdraw seizure  States that he has been alcohol free x7 hours  Denies SI or HI at this time       History of Illness: 49 y o  male with history of alcohol abuse and alcohol withdrawal seizures who presents with request for treatment for alcohol withdrawal   Patient states that he got out of drug and alcohol rehab around July 4th  He was sober for 75 days and had been going to [facility] twice a day  Patient states that his wife drinks and takes Xanax and from being around her he relapsed    Patient states he has been drinking approximately 1/5 of whiskey per day for the past 3 weeks with his last drink being Monday morning  Patient admits to history of withdrawal seizures  ED Vital Signs:   ED Triage Vitals [10/30/17 1535]   Temperature Pulse Respirations Blood Pressure SpO2   98 4 °F (36 9 °C) 89 18 143/83 100 %      Temp Source Heart Rate Source Patient Position - Orthostatic VS BP Location FiO2 (%)   Temporal Monitor Sitting Right arm --      Pain Score       No Pain        Wt Readings from Last 1 Encounters:   10/30/17 88 kg (194 lb)       Vital Signs (abnormal):  Maximum /102  CIWA 3  On 10/31 sat to 83% room air  BP low of 83/48  CIWA score maximum 15  Exam - tremors  Diaphoretic  Abnormal Labs/Diagnostic Test Results:   Ethanol 348  K 3 3  Glucose 158  Wbc 3 64, platelets 987  Labs am 10/31- K 3 3  Calcium 8  Wbc 2 69  Platelets 235    ED Treatment:   Medication Administration from 10/30/2017 1449 to 10/31/2017 0111       Date/Time Order Dose Route Action Action by Comments     10/31/2017 0047 sodium chloride 0 9 % bolus 1,000 mL 0 mL Intravenous Stopped Jc Caballero RN      10/30/2017 1648 sodium chloride 0 9 % bolus 1,000 mL 1,000 mL Intravenous [address] Jc Caballero RN      10/30/2017 1626 chlordiazePOXIDE (LIBRIUM) capsule 25 mg 25 mg Oral Given Jc Caballero RN           Past Medical/Surgical History: Active Ambulatory Problems     Diagnosis Date Noted    No Active Ambulatory Problems     Resolved Ambulatory Problems     Diagnosis Date Noted    No Resolved Ambulatory Problems     Past Medical History:   Diagnosis Date    Alcohol abuse     Alcohol withdrawal seizure ([place] [address] )     Insomnia     Psychiatric disorder        Admitting Diagnosis: Alcohol withdrawal ([place] [address] ) [F10 239]  S/P alcohol detoxification [Z09]  Withdrawal seizures ([place] [address] ) [W53 460, R56 9]    Age/Sex: 49 y o  male  Assessment/Plan: S/P Alcohol detox with history of alcohol withdrawal seizure:Patient is tremulous  ? Initiate CIWA protocol  ?  Librium 25 mg q 6 hours and Ativan 2 mg IV p r n   ? Thiamine, folic acid, and multivitamin  ? Continuous cardiopulmonary monitoring  ? Consult case management for possible alcohol rehab at time of discharge     Plan for Additional Problems:   Psychiatric disorder:  Continue sertraline, Neurontin, and trazodone  Admission Orders:  10/30/2017  2250 INPATIENT   Scheduled Meds:   chlordiazePOXIDE 25 mg Oral A0P [address]   folic acid 1 mg Oral Daily   gabapentin 100 mg Oral Daily   heparin (porcine) 5,000 Units Subcutaneous Q8H [address]   multivitamin-minerals 1 tablet Oral Daily   potassium chloride 40 mEq Oral Q4H   sertraline 50 mg Oral Daily   thiamine 100 mg Intravenous Daily   topiramate 25 mg Oral BID   traZODone 50 mg Oral HS     Continuous Infusions:   sodium chloride 125 mL/hr Last Rate: 125 mL/hr (10/31/17 0943)     PRN Meds:   acetaminophen    LORazepam 2 mg iv - used x 1      ondansetron     OTHER ORDERS: cardio pulmonary monitoring  scds  Consult psyche      [address] [place] [address] in the [facility] by Louis Tello for 2017  Network Utilization Review Department  Phone: [phone]; Fax [phone]  ATTENTION: The Network Utilization Review Department is now centralized for our 7 Facilities  Make a note that we have a new phone and fax numbers for our Department  Please call with any questions or concerns to [phone] and carefully follow the prompts so that you are directed to the right person  All voicemails are confidential  Fax any determinations, approvals, denials, and requests for initial or continue stay review clinical to [phone]  Due to HIGH CALL volume, it would be easier if you could please send faxed requests to expedite your requests and in part, help us provide discharge notifications faster

## 2017-11-01 LAB
ALBUMIN SERPL BCP-MCNC: 3.4 G/DL (ref 3.5–5)
ALP SERPL-CCNC: 39 U/L (ref 46–116)
ALT SERPL W P-5'-P-CCNC: 41 U/L (ref 12–78)
ANION GAP SERPL CALCULATED.3IONS-SCNC: 9 MMOL/L (ref 4–13)
AST SERPL W P-5'-P-CCNC: 32 U/L (ref 5–45)
BASOPHILS # BLD AUTO: 0.01 THOUSANDS/ΜL (ref 0–0.1)
BASOPHILS NFR BLD AUTO: 0 % (ref 0–1)
BILIRUB SERPL-MCNC: 0.9 MG/DL (ref 0.2–1)
BUN SERPL-MCNC: 6 MG/DL (ref 5–25)
CALCIUM SERPL-MCNC: 8.2 MG/DL (ref 8.3–10.1)
CHLORIDE SERPL-SCNC: 106 MMOL/L (ref 100–108)
CO2 SERPL-SCNC: 23 MMOL/L (ref 21–32)
CREAT SERPL-MCNC: 0.63 MG/DL (ref 0.6–1.3)
EOSINOPHIL # BLD AUTO: 0.18 THOUSAND/ΜL (ref 0–0.61)
EOSINOPHIL NFR BLD AUTO: 5 % (ref 0–6)
ERYTHROCYTE [DISTWIDTH] IN BLOOD BY AUTOMATED COUNT: 14.6 % (ref 11.6–15.1)
GFR SERPL CREATININE-BSD FRML MDRD: 117 ML/MIN/1.73SQ M
GLUCOSE SERPL-MCNC: 94 MG/DL (ref 65–140)
HCT VFR BLD AUTO: 41.2 % (ref 36.5–49.3)
HGB BLD-MCNC: 14.2 G/DL (ref 12–17)
LYMPHOCYTES # BLD AUTO: 0.96 THOUSANDS/ΜL (ref 0.6–4.47)
LYMPHOCYTES NFR BLD AUTO: 25 % (ref 14–44)
MAGNESIUM SERPL-MCNC: 1.8 MG/DL (ref 1.6–2.6)
MCH RBC QN AUTO: 30.4 PG (ref 26.8–34.3)
MCHC RBC AUTO-ENTMCNC: 34.5 G/DL (ref 31.4–37.4)
MCV RBC AUTO: 88 FL (ref 82–98)
MONOCYTES # BLD AUTO: 0.46 THOUSAND/ΜL (ref 0.17–1.22)
MONOCYTES NFR BLD AUTO: 12 % (ref 4–12)
NEUTROPHILS # BLD AUTO: 2.21 THOUSANDS/ΜL (ref 1.85–7.62)
NEUTS SEG NFR BLD AUTO: 58 % (ref 43–75)
PHOSPHATE SERPL-MCNC: 3 MG/DL (ref 2.7–4.5)
PLATELET # BLD AUTO: 86 THOUSANDS/UL (ref 149–390)
PMV BLD AUTO: 9.6 FL (ref 8.9–12.7)
POTASSIUM SERPL-SCNC: 3.9 MMOL/L (ref 3.5–5.3)
PROT SERPL-MCNC: 6.5 G/DL (ref 6.4–8.2)
RBC # BLD AUTO: 4.67 MILLION/UL (ref 3.88–5.62)
SODIUM SERPL-SCNC: 138 MMOL/L (ref 136–145)
WBC # BLD AUTO: 3.82 THOUSAND/UL (ref 4.31–10.16)

## 2017-11-01 PROCEDURE — 85025 COMPLETE CBC W/AUTO DIFF WBC: CPT | Performed by: INTERNAL MEDICINE

## 2017-11-01 PROCEDURE — 83735 ASSAY OF MAGNESIUM: CPT | Performed by: INTERNAL MEDICINE

## 2017-11-01 PROCEDURE — 80053 COMPREHEN METABOLIC PANEL: CPT | Performed by: INTERNAL MEDICINE

## 2017-11-01 PROCEDURE — 84100 ASSAY OF PHOSPHORUS: CPT | Performed by: INTERNAL MEDICINE

## 2017-11-01 RX ADMIN — LORAZEPAM 2 MG: 2 INJECTION, SOLUTION INTRAMUSCULAR; INTRAVENOUS at 05:11

## 2017-11-01 RX ADMIN — LORAZEPAM 2 MG: 2 INJECTION, SOLUTION INTRAMUSCULAR; INTRAVENOUS at 21:49

## 2017-11-01 RX ADMIN — FOLIC ACID 1 MG: 1 TABLET ORAL at 10:05

## 2017-11-01 RX ADMIN — TOPIRAMATE 25 MG: 25 TABLET, FILM COATED ORAL at 10:04

## 2017-11-01 RX ADMIN — SODIUM CHLORIDE 75 ML/HR: 0.9 INJECTION, SOLUTION INTRAVENOUS at 13:25

## 2017-11-01 RX ADMIN — TRAZODONE HYDROCHLORIDE 50 MG: 50 TABLET ORAL at 21:50

## 2017-11-01 RX ADMIN — CHLORDIAZEPOXIDE HYDROCHLORIDE 25 MG: 25 CAPSULE ORAL at 18:39

## 2017-11-01 RX ADMIN — HEPARIN SODIUM 5000 UNITS: 5000 INJECTION, SOLUTION INTRAVENOUS; SUBCUTANEOUS at 05:09

## 2017-11-01 RX ADMIN — HEPARIN SODIUM 5000 UNITS: 5000 INJECTION, SOLUTION INTRAVENOUS; SUBCUTANEOUS at 21:49

## 2017-11-01 RX ADMIN — GABAPENTIN 100 MG: 100 CAPSULE ORAL at 10:04

## 2017-11-01 RX ADMIN — HEPARIN SODIUM 5000 UNITS: 5000 INJECTION, SOLUTION INTRAVENOUS; SUBCUTANEOUS at 15:59

## 2017-11-01 RX ADMIN — CHLORDIAZEPOXIDE HYDROCHLORIDE 25 MG: 25 CAPSULE ORAL at 12:03

## 2017-11-01 RX ADMIN — TOPIRAMATE 25 MG: 25 TABLET, FILM COATED ORAL at 18:39

## 2017-11-01 RX ADMIN — LORAZEPAM 2 MG: 2 INJECTION, SOLUTION INTRAMUSCULAR; INTRAVENOUS at 12:05

## 2017-11-01 RX ADMIN — SERTRALINE HYDROCHLORIDE 50 MG: 50 TABLET ORAL at 10:05

## 2017-11-01 RX ADMIN — THIAMINE HYDROCHLORIDE 100 MG: 100 INJECTION, SOLUTION INTRAMUSCULAR; INTRAVENOUS at 10:05

## 2017-11-01 RX ADMIN — CHLORDIAZEPOXIDE HYDROCHLORIDE 25 MG: 25 CAPSULE ORAL at 05:09

## 2017-11-01 RX ADMIN — CHLORDIAZEPOXIDE HYDROCHLORIDE 25 MG: 25 CAPSULE ORAL at 00:05

## 2017-11-01 RX ADMIN — Medication 1 TABLET: at 10:04

## 2017-11-01 NOTE — PROGRESS NOTES
Progress Note - Claudia Pineda 49 y o  male MRN: [id number]  Unit/Bed#: -01 Encounter: [id number]    Assessment:  Principal Problem:    S/P alcohol detoxification  Active Problems:    Psychiatric disorder    Alcohol withdrawal seizure ([address] )    Alcohol abuse  Resolved Problems:    * No resolved hospital problems  *      Plan:  · Alcohol abuse/alcohol withdrawal  Continue on alcohol withdrawal protocol with CIWA scores  Continue on Librium  IV fluids  Started on thiamine, folic acid and multivitamin  Ativan p r n  Monitor on telemetry   Psychiatry consult appreciated  The recommended inpatient rehab once stable  Continue on Topamax  · Anxiety -On Zoloft and trazodone  DVT prophylaxis  Discussed with patient in detail  Subjective:   Patient is seen and examined at bedside  No new complaints  Is tremulous  All other ROS are negative  Objective:   Vitals: Blood pressure (!) 153/101, pulse (!) 51, temperature 97 6 °F (36 4 °C), resp  rate 17, height 5' 10" (1 778 m), weight 87 5 kg (192 lb 14 4 oz), SpO2 97 %  ,Body mass index is 27 68 kg/m²  SPO2 RA Rest    Flowsheet Row ED to Hosp-Admission (Current) from 10/30/2017 in [address] [place] [address] Intensive Care Unit   SpO2  97 %   SpO2 Activity  At Rest   O2 Device  None (Room air)   O2 Flow Rate  No data        I&O:   Intake/Output Summary (Last 24 hours) at 11/01/17 0801  Last data filed at 11/01/17 0501   Gross per 24 hour   Intake             2460 ml   Output             3300 ml   Net             -840 ml       Physical Exam:    General- Alert, lying comfortably in bed  Not in any acute distress  HEENT- MINH, EOM intact  Neck- Supple, No JVD  CVS- regular, S1 and S2 normal  Chest- Bilateral Air entry, No rhochi, crackles or wheezing present  Abdomen- soft, nontender, not distended, no guarding or rigidity, BS+  Extremities-  No pedal edema, No calf tenderness                         Normal ROM in all extremities    CNS-   Alert, awake and orientedx3  No focal deficits present  Invasive Devices     Peripheral Intravenous Line            Peripheral IV 10/30/17 1 day                      Social History  reviewed  History reviewed  No pertinent family history   reviewed    Meds:  Current Facility-Administered Medications   Medication Dose Route Frequency Provider Last Rate Last Dose    acetaminophen (TYLENOL) tablet 650 mg  650 mg Oral Q6H PRN STEPHANIE Escobar        chlordiazePOXIDE (LIBRIUM) capsule 25 mg  25 mg Oral Q6H [facility] STEPHANIE Savage   25 mg at 04/63/87 3766    folic acid (FOLVITE) tablet 1 mg  1 mg Oral Daily STEPHANIE Savage   1 mg at 10/31/17 0948    gabapentin (NEURONTIN) capsule 100 mg  100 mg Oral Daily STEPHANIE Savage   100 mg at 10/31/17 0948    heparin (porcine) subcutaneous injection 5,000 Units  5,000 Units Subcutaneous [facility] Hussain Bowen MD   5,000 Units at 11/01/17 0509    LORazepam (ATIVAN) 2 mg/mL injection 2 mg  2 mg Intravenous Q2H PRN Hussain Bowen MD   2 mg at 11/01/17 0511    multivitamin-minerals (CENTRUM) tablet 1 tablet  1 tablet Oral Daily STEPHANIE Savage   1 tablet at 10/31/17 0946    ondansetron (ZOFRAN) injection 4 mg  4 mg Intravenous Q6H PRN STEPHANIE Escobar        sertraline (ZOLOFT) tablet 50 mg  50 mg Oral Daily STEPHANIE Savage   50 mg at 10/31/17 0949    sodium chloride 0 9 % infusion  125 mL/hr Intravenous Continuous Hussain Bowen  mL/hr at 10/31/17 1734 125 mL/hr at 10/31/17 1734    thiamine (VITAMIN B1) 100 mg in sodium chloride 0 9 % 50 mL IVPB  100 mg Intravenous Daily Hussain Bowen MD   Stopped at 10/31/17 1225    topiramate (TOPAMAX) tablet 25 mg  25 mg Oral BID STEPHANIE Savage   25 mg at 10/31/17 1743    traZODone (DESYREL) tablet 50 mg  50 mg Oral HS STEPHANIE Savage   50 mg at 10/31/17 2137      Prescriptions Prior to Admission   Medication    gabapentin (NEURONTIN) 100 mg capsule    sertraline (ZOLOFT) 50 mg tablet    topiramate (TOPAMAX) 50 MG tablet    traZODone (DESYREL) 50 mg tablet       Labs:    Results from last 7 days  Lab Units 11/01/17  0447 10/31/17  0445 10/30/17  1640   WBC Thousand/uL 3 82* 2 69* 3 64*   HEMOGLOBIN g/dL 14 2 13 5 14 5   HEMATOCRIT % 41 2 39 4 41 2   PLATELETS Thousands/uL 86* 100* 111*   NEUTROS PCT % 58  --  61   LYMPHS PCT % 25  --  24   MONOS PCT % 12  --  13*   EOS PCT % 5  --  1       Results from last 7 days  Lab Units 11/01/17  0447 10/31/17  0445 10/30/17  1640   SODIUM mmol/L 138 139 142   POTASSIUM mmol/L 3 9 3 3* 3 3*   CHLORIDE mmol/L 106 103 107   CO2 mmol/L 23 24 24   BUN mg/dL 6 8 8   CREATININE mg/dL 0 63 0 68 0 69   CALCIUM mg/dL 8 2* 8 0* 8 4   TOTAL PROTEIN g/dL 6 5  --  7 2   BILIRUBIN TOTAL mg/dL 0 90  --  0 30   ALK PHOS U/L 39*  --  48   ALT U/L 41  --  43   AST U/L 32  --  38   GLUCOSE RANDOM mg/dL 94 109 158*     No results found for: TROPONINI, CKTOTAL      No results found for: Lidia Maxgigi, SPUTUMCULTUR      Imaging:  No results found for this or any previous visit  No results found for this or any previous visit  Labs & Imaging: I have personally reviewed pertinent reports        VTE Pharmacologic Prophylaxis: Heparin  VTE Mechanical Prophylaxis: sequential compression device    Code Status:   Level 1 - Full Code      "This note has been constructed using a voice recognition system"      Devyn Gautam MD  11/1/2017,8:01 AM

## 2017-11-01 NOTE — SOCIAL WORK
Continuing to follow patient  Received call from SUSIE Barker/Case Management and she confirmed that patient's insurance was terminated 10/31/17

## 2017-11-01 NOTE — PLAN OF CARE
DISCHARGE PLANNING     Discharge to home or other facility with appropriate resources Progressing        DISCHARGE PLANNING - CARE MANAGEMENT     Discharge to post-acute care or home with appropriate resources Progressing        INFECTION - ADULT     Absence or prevention of progression during hospitalization Progressing        Knowledge Deficit     Patient/family/caregiver demonstrates understanding of disease process, treatment plan, medications, and discharge instructions Progressing        METABOLIC, FLUID AND ELECTROLYTES - ADULT     Electrolytes maintained within normal limits Progressing     Fluid balance maintained Progressing        NEUROSENSORY - ADULT     Achieves stable or improved neurological status Progressing     Absence of seizures Progressing     Remains free of injury related to seizures activity Progressing     Achieves maximal functionality and self care Progressing        PAIN - ADULT     Verbalizes/displays adequate comfort level or baseline comfort level Progressing        SAFETY ADULT     Patient will remain free of falls Progressing     Maintain or return to baseline ADL function Progressing     Maintain or return mobility status to optimal level Progressing

## 2017-11-02 LAB
ANION GAP SERPL CALCULATED.3IONS-SCNC: 9 MMOL/L (ref 4–13)
BASOPHILS # BLD AUTO: 0.02 THOUSANDS/ΜL (ref 0–0.1)
BASOPHILS NFR BLD AUTO: 0 % (ref 0–1)
BUN SERPL-MCNC: 8 MG/DL (ref 5–25)
CALCIUM SERPL-MCNC: 8.7 MG/DL (ref 8.3–10.1)
CHLORIDE SERPL-SCNC: 106 MMOL/L (ref 100–108)
CO2 SERPL-SCNC: 22 MMOL/L (ref 21–32)
CREAT SERPL-MCNC: 0.68 MG/DL (ref 0.6–1.3)
EOSINOPHIL # BLD AUTO: 0.11 THOUSAND/ΜL (ref 0–0.61)
EOSINOPHIL NFR BLD AUTO: 2 % (ref 0–6)
ERYTHROCYTE [DISTWIDTH] IN BLOOD BY AUTOMATED COUNT: 14.7 % (ref 11.6–15.1)
GFR SERPL CREATININE-BSD FRML MDRD: 113 ML/MIN/1.73SQ M
GLUCOSE SERPL-MCNC: 112 MG/DL (ref 65–140)
HCT VFR BLD AUTO: 41.1 % (ref 36.5–49.3)
HGB BLD-MCNC: 14.4 G/DL (ref 12–17)
LYMPHOCYTES # BLD AUTO: 0.92 THOUSANDS/ΜL (ref 0.6–4.47)
LYMPHOCYTES NFR BLD AUTO: 20 % (ref 14–44)
MCH RBC QN AUTO: 31.2 PG (ref 26.8–34.3)
MCHC RBC AUTO-ENTMCNC: 35 G/DL (ref 31.4–37.4)
MCV RBC AUTO: 89 FL (ref 82–98)
MONOCYTES # BLD AUTO: 0.49 THOUSAND/ΜL (ref 0.17–1.22)
MONOCYTES NFR BLD AUTO: 11 % (ref 4–12)
NEUTROPHILS # BLD AUTO: 3.07 THOUSANDS/ΜL (ref 1.85–7.62)
NEUTS SEG NFR BLD AUTO: 67 % (ref 43–75)
PLATELET # BLD AUTO: 92 THOUSANDS/UL (ref 149–390)
PMV BLD AUTO: 10.1 FL (ref 8.9–12.7)
POTASSIUM SERPL-SCNC: 3.3 MMOL/L (ref 3.5–5.3)
RBC # BLD AUTO: 4.62 MILLION/UL (ref 3.88–5.62)
SODIUM SERPL-SCNC: 137 MMOL/L (ref 136–145)
WBC # BLD AUTO: 4.61 THOUSAND/UL (ref 4.31–10.16)

## 2017-11-02 PROCEDURE — 85025 COMPLETE CBC W/AUTO DIFF WBC: CPT | Performed by: INTERNAL MEDICINE

## 2017-11-02 PROCEDURE — 80048 BASIC METABOLIC PNL TOTAL CA: CPT | Performed by: INTERNAL MEDICINE

## 2017-11-02 RX ORDER — POTASSIUM CHLORIDE 20 MEQ/1
40 TABLET, EXTENDED RELEASE ORAL EVERY 4 HOURS
Status: COMPLETED | OUTPATIENT
Start: 2017-11-02 | End: 2017-11-02

## 2017-11-02 RX ORDER — CHLORDIAZEPOXIDE HYDROCHLORIDE 25 MG/1
25 CAPSULE, GELATIN COATED ORAL EVERY 8 HOURS SCHEDULED
Status: DISCONTINUED | OUTPATIENT
Start: 2017-11-02 | End: 2017-11-03

## 2017-11-02 RX ADMIN — HEPARIN SODIUM 5000 UNITS: 5000 INJECTION, SOLUTION INTRAVENOUS; SUBCUTANEOUS at 21:28

## 2017-11-02 RX ADMIN — LORAZEPAM 2 MG: 2 INJECTION, SOLUTION INTRAMUSCULAR; INTRAVENOUS at 05:53

## 2017-11-02 RX ADMIN — HEPARIN SODIUM 5000 UNITS: 5000 INJECTION, SOLUTION INTRAVENOUS; SUBCUTANEOUS at 05:53

## 2017-11-02 RX ADMIN — FOLIC ACID 1 MG: 1 TABLET ORAL at 09:00

## 2017-11-02 RX ADMIN — TOPIRAMATE 25 MG: 25 TABLET, FILM COATED ORAL at 17:11

## 2017-11-02 RX ADMIN — POTASSIUM CHLORIDE 40 MEQ: 1500 TABLET, EXTENDED RELEASE ORAL at 09:00

## 2017-11-02 RX ADMIN — CHLORDIAZEPOXIDE HYDROCHLORIDE 25 MG: 25 CAPSULE ORAL at 05:53

## 2017-11-02 RX ADMIN — TOPIRAMATE 25 MG: 25 TABLET, FILM COATED ORAL at 09:00

## 2017-11-02 RX ADMIN — GABAPENTIN 100 MG: 100 CAPSULE ORAL at 09:00

## 2017-11-02 RX ADMIN — POTASSIUM CHLORIDE 40 MEQ: 1500 TABLET, EXTENDED RELEASE ORAL at 11:23

## 2017-11-02 RX ADMIN — CHLORDIAZEPOXIDE HYDROCHLORIDE 25 MG: 25 CAPSULE ORAL at 21:28

## 2017-11-02 RX ADMIN — CHLORDIAZEPOXIDE HYDROCHLORIDE 25 MG: 25 CAPSULE ORAL at 00:47

## 2017-11-02 RX ADMIN — CHLORDIAZEPOXIDE HYDROCHLORIDE 25 MG: 25 CAPSULE ORAL at 13:14

## 2017-11-02 RX ADMIN — SERTRALINE HYDROCHLORIDE 50 MG: 50 TABLET ORAL at 09:00

## 2017-11-02 RX ADMIN — HEPARIN SODIUM 5000 UNITS: 5000 INJECTION, SOLUTION INTRAVENOUS; SUBCUTANEOUS at 13:14

## 2017-11-02 RX ADMIN — Medication 1 TABLET: at 09:00

## 2017-11-02 RX ADMIN — SODIUM CHLORIDE 75 ML/HR: 0.9 INJECTION, SOLUTION INTRAVENOUS at 17:10

## 2017-11-02 RX ADMIN — TRAZODONE HYDROCHLORIDE 50 MG: 50 TABLET ORAL at 21:28

## 2017-11-02 RX ADMIN — THIAMINE HYDROCHLORIDE 100 MG: 100 INJECTION, SOLUTION INTRAMUSCULAR; INTRAVENOUS at 09:09

## 2017-11-02 NOTE — PROGRESS NOTES
Progress Note - Parish Ricardo 49 y o  male MRN: [id number]  Unit/Bed#: -01 Encounter: [id number]    Assessment:  Principal Problem:    S/P alcohol detoxification  Active Problems:    Psychiatric disorder    Alcohol withdrawal seizure ([address] )    Alcohol abuse  Resolved Problems:    * No resolved hospital problems  *      Plan:  · Alcohol abuse/alcohol withdrawal  Continue on alcohol withdrawal protocol with CIWA scores  Continue on Librium  Will taper today  DC  IV fluids  Started on thiamine, folic acid and multivitamin  Ativan p r n  Monitor on telemetry   Psychiatry consult appreciated  The recommended inpatient rehab once stable  Continue on Topamax  · Hypokalemia-potassium supplementation  · Anxiety -On Zoloft and trazodone  DVT prophylaxis  Discussed with patient in detail     Will downgrade to [facility] surgical floor on telemetry  Subjective:   Patient is seen and examined at bedside  Denies any new complaints  Afebrile  All other ROS are negative  Objective:   Vitals: Blood pressure 124/85, pulse 58, temperature 97 5 °F (36 4 °C), resp  rate 16, height 5' 10" (1 778 m), weight 87 2 kg (192 lb 3 9 oz), SpO2 95 %  ,Body mass index is 27 58 kg/m²  SPO2 RA Rest    Flowsheet Row ED to Hosp-Admission (Current) from 10/30/2017 in [address] [place] [address] Intensive Care Unit   SpO2  95 %   SpO2 Activity  At Rest   O2 Device  None (Room air)   O2 Flow Rate  No data        I&O:   Intake/Output Summary (Last 24 hours) at 11/02/17 0743  Last data filed at 11/01/17 2305   Gross per 24 hour   Intake                0 ml   Output             2600 ml   Net            -2600 ml       Physical Exam:    General- Alert, lying comfortably in bed  Not in any acute distress  HEENT- MINH, EOM intact  Neck- Supple, No JVD  CVS- regular, S1 and S2 normal   Chest- Bilateral Air entry, No rhochi, crackles or wheezing present    Abdomen- soft, nontender, not distended, no guarding or rigidity, BS+  Extremities-  No pedal edema, No calf tenderness                         Normal ROM in all extremities  CNS-   Alert, awake and orientedx3  No focal deficits present  Invasive Devices     Peripheral Intravenous Line            Peripheral IV 10/30/17 2 days    Peripheral IV 11/01/17 Left Wrist less than 1 day                      Social History  reviewed  History reviewed  No pertinent family history   reviewed    Meds:  Current Facility-Administered Medications   Medication Dose Route Frequency Provider Last Rate Last Dose    acetaminophen (TYLENOL) tablet 650 mg  650 mg Oral Q6H PRN STEPHANIE Guerrero        chlordiazePOXIDE (LIBRIUM) capsule 25 mg  25 mg Oral Q8H Annabella Lee MD        folic acid (FOLVITE) tablet 1 mg  1 mg Oral Daily STEPHANIE Savage   1 mg at 11/01/17 1005    gabapentin (NEURONTIN) capsule 100 mg  100 mg Oral Daily STEPHANIE Savage   100 mg at 11/01/17 1004    heparin (porcine) subcutaneous injection 5,000 Units  5,000 Units Subcutaneous Q8H Annabella Lee MD   5,000 Units at 11/02/17 0553    LORazepam (ATIVAN) 2 mg/mL injection 2 mg  2 mg Intravenous Q2H PRN Bing Pineda MD   2 mg at 11/02/17 0553    multivitamin-minerals (CENTRUM) tablet 1 tablet  1 tablet Oral Daily STEPHANIE Savage   1 tablet at 11/01/17 1004    ondansetron (ZOFRAN) injection 4 mg  4 mg Intravenous Q6H PRN STEPHANIE Guerrero        potassium chloride (K-DUR,KLOR-CON) CR tablet 40 mEq  40 mEq Oral Q4H Bing Pineda MD        sertraline (ZOLOFT) tablet 50 mg  50 mg Oral Daily STEPHANIE Savage   50 mg at 11/01/17 1005    sodium chloride 0 9 % infusion  75 mL/hr Intravenous Continuous Bing Pineda MD 75 mL/hr at 11/01/17 1325 75 mL/hr at 11/01/17 1325    thiamine (VITAMIN B1) 100 mg in sodium chloride 0 9 % 50 mL IVPB  100 mg Intravenous Daily Bing Pineda  mL/hr at 11/01/17 1005 100 mg at 11/01/17 1005    topiramate (TOPAMAX) tablet 25 mg  25 mg Oral BID Crystal Robert STEPHANIE Pierson   25 mg at 11/01/17 1839    traZODone (DESYREL) tablet 50 mg  50 mg Oral HS Jeanie ParekhzohrehmilanaSTEPHANIE   50 mg at 11/01/17 2150      Prescriptions Prior to Admission   Medication    gabapentin (NEURONTIN) 100 mg capsule    sertraline (ZOLOFT) 50 mg tablet    topiramate (TOPAMAX) 50 MG tablet    traZODone (DESYREL) 50 mg tablet       Labs:    Results from last 7 days  Lab Units 11/02/17  0458 11/01/17  0447 10/31/17  0445 10/30/17  1640   WBC Thousand/uL 4 61 3 82* 2 69* 3 64*   HEMOGLOBIN g/dL 14 4 14 2 13 5 14 5   HEMATOCRIT % 41 1 41 2 39 4 41 2   PLATELETS Thousands/uL 92* 86* 100* 111*   NEUTROS PCT % 67 58  --  61   LYMPHS PCT % 20 25  --  24   MONOS PCT % 11 12  --  13*   EOS PCT % 2 5  --  1       Results from last 7 days  Lab Units 11/02/17  0458 11/01/17  0447 10/31/17  0445 10/30/17  1640   SODIUM mmol/L 137 138 139 142   POTASSIUM mmol/L 3 3* 3 9 3 3* 3 3*   CHLORIDE mmol/L 106 106 103 107   CO2 mmol/L 22 23 24 24   BUN mg/dL 8 6 8 8   CREATININE mg/dL 0 68 0 63 0 68 0 69   CALCIUM mg/dL 8 7 8 2* 8 0* 8 4   TOTAL PROTEIN g/dL  --  6 5  --  7 2   BILIRUBIN TOTAL mg/dL  --  0 90  --  0 30   ALK PHOS U/L  --  39*  --  48   ALT U/L  --  41  --  43   AST U/L  --  32  --  38   GLUCOSE RANDOM mg/dL 112 94 109 158*     No results found for: TROPONINI, CKTOTAL      No results found for: Ray Meuse, SPUTUMCULTUR      Imaging:  No results found for this or any previous visit  No results found for this or any previous visit  Labs & Imaging: I have personally reviewed pertinent reports        VTE Pharmacologic Prophylaxis: Heparin  VTE Mechanical Prophylaxis: sequential compression device    Code Status:   Level 1 - Full Code      "This note has been constructed using a voice recognition system"      Maynor Bermeo MD  11/2/2017,7:43 AM

## 2017-11-03 VITALS
OXYGEN SATURATION: 96 % | HEART RATE: 77 BPM | SYSTOLIC BLOOD PRESSURE: 134 MMHG | DIASTOLIC BLOOD PRESSURE: 91 MMHG | WEIGHT: 192.9 LBS | HEIGHT: 70 IN | RESPIRATION RATE: 16 BRPM | BODY MASS INDEX: 27.62 KG/M2 | TEMPERATURE: 98 F

## 2017-11-03 PROBLEM — Z92.89 S/P ALCOHOL DETOXIFICATION: Status: RESOLVED | Noted: 2017-10-30 | Resolved: 2017-11-03

## 2017-11-03 LAB
ANION GAP SERPL CALCULATED.3IONS-SCNC: 10 MMOL/L (ref 4–13)
BUN SERPL-MCNC: 10 MG/DL (ref 5–25)
CALCIUM SERPL-MCNC: 8.4 MG/DL (ref 8.3–10.1)
CHLORIDE SERPL-SCNC: 106 MMOL/L (ref 100–108)
CO2 SERPL-SCNC: 23 MMOL/L (ref 21–32)
CREAT SERPL-MCNC: 0.67 MG/DL (ref 0.6–1.3)
GFR SERPL CREATININE-BSD FRML MDRD: 114 ML/MIN/1.73SQ M
GLUCOSE SERPL-MCNC: 99 MG/DL (ref 65–140)
POTASSIUM SERPL-SCNC: 3.7 MMOL/L (ref 3.5–5.3)
SODIUM SERPL-SCNC: 139 MMOL/L (ref 136–145)

## 2017-11-03 PROCEDURE — 80048 BASIC METABOLIC PNL TOTAL CA: CPT | Performed by: INTERNAL MEDICINE

## 2017-11-03 RX ORDER — CHLORDIAZEPOXIDE HYDROCHLORIDE 5 MG/1
10 CAPSULE, GELATIN COATED ORAL EVERY 8 HOURS SCHEDULED
Status: DISCONTINUED | OUTPATIENT
Start: 2017-11-03 | End: 2017-11-03 | Stop reason: HOSPADM

## 2017-11-03 RX ORDER — FOLIC ACID 1 MG/1
1 TABLET ORAL DAILY
Qty: 30 TABLET | Refills: 0 | Status: SHIPPED | OUTPATIENT
Start: 2017-11-04 | End: 2018-06-16 | Stop reason: ALTCHOICE

## 2017-11-03 RX ORDER — LANOLIN ALCOHOL/MO/W.PET/CERES
100 CREAM (GRAM) TOPICAL DAILY
Qty: 10 TABLET | Refills: 0 | Status: SHIPPED | OUTPATIENT
Start: 2017-11-03 | End: 2018-06-16 | Stop reason: ALTCHOICE

## 2017-11-03 RX ORDER — CHLORDIAZEPOXIDE HYDROCHLORIDE 10 MG/1
10 CAPSULE, GELATIN COATED ORAL EVERY 8 HOURS SCHEDULED
Qty: 15 CAPSULE | Refills: 0 | Status: SHIPPED | OUTPATIENT
Start: 2017-11-03 | End: 2018-06-16 | Stop reason: ALTCHOICE

## 2017-11-03 RX ADMIN — CHLORDIAZEPOXIDE HYDROCHLORIDE 25 MG: 25 CAPSULE ORAL at 05:39

## 2017-11-03 RX ADMIN — HEPARIN SODIUM 5000 UNITS: 5000 INJECTION, SOLUTION INTRAVENOUS; SUBCUTANEOUS at 05:39

## 2017-11-03 RX ADMIN — THIAMINE HYDROCHLORIDE 100 MG: 100 INJECTION, SOLUTION INTRAMUSCULAR; INTRAVENOUS at 09:40

## 2017-11-03 RX ADMIN — TOPIRAMATE 25 MG: 25 TABLET, FILM COATED ORAL at 09:38

## 2017-11-03 RX ADMIN — Medication 1 TABLET: at 09:38

## 2017-11-03 RX ADMIN — SERTRALINE HYDROCHLORIDE 50 MG: 50 TABLET ORAL at 09:38

## 2017-11-03 RX ADMIN — FOLIC ACID 1 MG: 1 TABLET ORAL at 09:38

## 2017-11-03 RX ADMIN — GABAPENTIN 100 MG: 100 CAPSULE ORAL at 09:38

## 2017-11-03 NOTE — SOCIAL WORK
Patient is for discharge today to [facility] in [place], [place] for alcohol rehab  [facility] and arrange for [facility]  Transport is set for 1430

## 2017-11-03 NOTE — PLAN OF CARE
DISCHARGE PLANNING     Discharge to home or other facility with appropriate resources Progressing        DISCHARGE PLANNING - CARE MANAGEMENT     Discharge to post-acute care or home with appropriate resources Progressing        INFECTION - ADULT     Absence or prevention of progression during hospitalization Progressing        Knowledge Deficit     Patient/family/caregiver demonstrates understanding of disease process, treatment plan, medications, and discharge instructions Progressing        METABOLIC, FLUID AND ELECTROLYTES - ADULT     Electrolytes maintained within normal limits Progressing     Fluid balance maintained Progressing        NEUROSENSORY - ADULT     Achieves stable or improved neurological status Progressing     Absence of seizures Progressing     Remains free of injury related to seizures activity Progressing     Achieves maximal functionality and self care Progressing        PAIN - ADULT     Verbalizes/displays adequate comfort level or baseline comfort level Progressing        Potential for Falls     Patient will remain free of falls Progressing        Prexisting or High Potential for Compromised Skin Integrity     Skin integrity is maintained or improved Progressing        SAFETY ADULT     Patient will remain free of falls Progressing     Maintain or return to baseline ADL function Progressing     Maintain or return mobility status to optimal level Progressing

## 2017-11-03 NOTE — DISCHARGE SUMMARY
Discharge Summary - Abiola Alvarado 49 y o  male MRN: [id number]  Unit/Bed#: -01 Encounter: [id number]    Admission Date:    10/30/2017   Discharge Date:   11/03/17   Admitting Diagnosis:   Alcohol withdrawal ([facility] ) [F10 239]  S/P alcohol detoxification [Z09]  Withdrawal seizures ([facility] ) [B95 289, R56 9]  Admitting Provider:   Maynor Bermeo MD  Discharge Provider:   Maynor Bermeo MD     Primary Care Physician at Discharge:   LATASHA Mccray,[phone]    HPI:   65-year-old male with history of alcohol abuse and alcohol withdrawal seizure presented to the emergency room with alcohol intoxication and/withdrawal   For a detailed HPI please refer to the admission note  Procedures Performed:   No orders of the defined types were placed in this encounter  Hospital Course:   Patient was admitted with alcohol intoxication/withdrawal   Patient was started on alcohol withdrawal protocol with CIWA score  He was started on IV fluids with thiamine, folic acid and multivitamin  He was also started on Librium and p r n  Ativan for withdrawal symptoms  Patient's potassium was replenished  For monitored closely for any withdrawal symptoms  He continued to get detoxed  Patient was seen by  and requested inpatient rehab for alcohol  Patient was accepted at [address] [place] [address]  Patient will continue to get detox at that facility  Patient is hemodynamically stable for transfer  Continue rest of the management as per physician at [address] [place] [address] and recommend to taper Librium and Ativan p r n  as tolerated  Continue on Zoloft and trazodone for anxiety  Consulting Providers   Psychiatry      Complications:  None    Labs:   Lab Results   Component Value Date    WBC 4 61 11/02/2017    RBC 4 62 11/02/2017    HGB 14 4 11/02/2017    HCT 41 1 11/02/2017    MCV 89 11/02/2017    MCH 31 2 11/02/2017    RDW 14 7 11/02/2017    PLT 92 (L) 11/02/2017     Lab Results Component Value Date    CREATININE 0 67 11/03/2017    BUN 10 11/03/2017     11/03/2017    K 3 7 11/03/2017     11/03/2017    CO2 23 11/03/2017    GLUCOSE 99 11/03/2017    PROT 6 5 11/01/2017    ALKPHOS 39 (L) 11/01/2017    ALT 41 11/01/2017    AST 32 11/01/2017       Treatments:  IV fluids, thiamine, folic acid, Topamax, Librium, p r n  Ativan, Zoloft and trazodone  Discharge Diagnosis:   Principal Problem:    Alcohol abuse  Active Problems:    Psychiatric disorder  Resolved Problems:    Alcohol withdrawal seizure ([address] )    S/P alcohol detoxification      Condition at Discharge:   Good     Code Status: Level 1 - Full Code  Advance Directive and Living Will: <no information>  Power of :    POLST:      Discharge instructions/Information to patient and family:   See after visit summary for information provided to patient and family  Provisions for Follow-Up Care:  See after visit summary for information related to follow-up care and any pertinent home health orders  Disposition:   Inpatient acute rehab at [address] [place] [address]    Planned Readmission:   No    Discharge Statement   I spent 35 minutes discharging the patient  This time was spent on the day of discharge  I had direct contact with the patient on the day of discharge  Greater than 50% of the total time was spent examining patient, answering all patient questions, arranging and discussing plan of care with patient as well as directly providing post-discharge instructions  Additional time then spent on discharge activities  Discharge Medications:  See after visit summary for reconciled discharge medications provided to patient and family        "This note has been constructed using a voice recognition system"    Marcell Mohr MD  11/3/2017,12:45 PM

## 2017-11-03 NOTE — SOCIAL WORK
Spoke with Antionette Chan of [facility]  He state that he has been working with patient for the past week trying to get him into one of their rehab centers  He is working on getting patients COBRA started  He states that he has a bed at [facility] in [place], [place] for patient today and that he will provide transportation  Await final details from him  Spoke with patient and he is agreeable to IP rehab at this facility

## 2017-11-03 NOTE — DISCHARGE INSTRUCTIONS
As per physician at [address] [facility] in [place]  Will recommend to taper Librium and p r n  Ativan as tolerated

## 2017-11-08 NOTE — CASE MANAGEMENT
Luly An MD Physician Signed Internal Medicine  Discharge Summaries Date of Service: 11/3/2017 12:45 PM         []Hide copied text  []Gerhard for attribution information  Discharge Summary - Inga Cohen 49 y o  male MRN: [id number]  Unit/Bed#: -01 Encounter: [id number]     Admission Date:         10/30/2017   Discharge Date:   11/03/17   Admitting Diagnosis:   Alcohol withdrawal ([facility] ) [F10 239]  S/P alcohol detoxification [Z09]  Withdrawal seizures ([facility] ) [D57 932, R56 9]  Admitting Provider:   Luly An MD  Discharge Provider:   Luly An MD      Primary Care Physician at Discharge:   KRYS Jones,[phone]     HPI:   69-year-old male with history of alcohol abuse and alcohol withdrawal seizure presented to the emergency room with alcohol intoxication and/withdrawal   For a detailed HPI please refer to the admission note      Procedures Performed:   No orders of the defined types were placed in this encounter   [facility] Course:   Patient was admitted with alcohol intoxication/withdrawal   Patient was started on alcohol withdrawal protocol with CIWA score  He was started on IV fluids with thiamine, folic acid and multivitamin  He was also started on Librium and p r n  Ativan for withdrawal symptoms  Patient's potassium was replenished  For monitored closely for any withdrawal symptoms  He continued to get detoxed  Patient was seen by  and requested inpatient rehab for alcohol  Patient was accepted at [facility]  Patient will continue to get detox at that facility  Patient is hemodynamically stable for transfer  Continue rest of the management as per physician at [address] [place] [address] and recommend to taper Librium and Ativan p r n  as tolerated    Continue on Zoloft and trazodone for anxiety      Consulting Providers   Psychiatry      Complications:  None     Labs:         Lab Results   Component Value Date   WBC 4 61 11/02/2017     RBC 4 62 11/02/2017     HGB 14 4 11/02/2017     HCT 41 1 11/02/2017     MCV 89 11/02/2017     MCH 31 2 11/02/2017     RDW 14 7 11/02/2017     PLT 92 (L) 11/02/2017            Lab Results   Component Value Date     CREATININE 0 67 11/03/2017     BUN 10 11/03/2017      11/03/2017     K 3 7 11/03/2017      11/03/2017     CO2 23 11/03/2017     GLUCOSE 99 11/03/2017     PROT 6 5 11/01/2017     ALKPHOS 39 (L) 11/01/2017     ALT 41 11/01/2017     AST 32 11/01/2017         Treatments:  IV fluids, thiamine, folic acid, Topamax, Librium, p r n  Ativan, Zoloft and trazodone      Discharge Diagnosis:   Principal Problem:    Alcohol abuse  Active Problems:    Psychiatric disorder  Resolved Problems:    Alcohol withdrawal seizure ([address] )    S/P alcohol detoxification        Condition at Discharge:   Good      Code Status: Level 1 - Full Code  Advance Directive and Living Will: <no information>  Power of :    POLST:       Discharge instructions/Information to patient and family:   See after visit summary for information provided to patient and family        Provisions for Follow-Up Care:  See after visit summary for information related to follow-up care and any pertinent home health orders        Disposition:   Inpatient acute rehab at [address] [place] [address],[facility] [address] in [address] [place] [address] Readmission:   No     Discharge Statement   I spent 35 minutes discharging the patient  This time was spent on the day of discharge  I had direct contact with the patient on the day of discharge  Greater than 50% of the total time was spent examining patient, answering all patient questions, arranging and discussing plan of care with patient as well as directly providing post-discharge instructions   Additional time then spent on discharge activities      Discharge Medications:  See after visit summary for reconciled discharge medications provided to patient and family        "This note has been constructed using a voice recognition system"    Chris Patton MD  11/3/2017,12:45 PM

## 2018-06-16 ENCOUNTER — HOSPITAL ENCOUNTER (EMERGENCY)
Facility: HOSPITAL | Age: 48
Discharge: HOME | End: 2018-06-18
Attending: EMERGENCY MEDICINE | Admitting: EMERGENCY MEDICINE
Payer: COMMERCIAL

## 2018-06-16 DIAGNOSIS — F10.10 ALCOHOL ABUSE: Primary | ICD-10-CM

## 2018-06-16 LAB
ALBUMIN SERPL BCP-MCNC: 4.3 G/DL (ref 3.5–5)
ALP SERPL-CCNC: 59 U/L (ref 46–116)
ALT SERPL W P-5'-P-CCNC: 152 U/L (ref 12–78)
AMPHETAMINES SERPL QL SCN: NEGATIVE
ANION GAP SERPL CALCULATED.3IONS-SCNC: 16 MMOL/L (ref 4–13)
AST SERPL W P-5'-P-CCNC: 161 U/L (ref 5–45)
ATRIAL RATE: 90 BPM
BARBITURATES UR QL: NEGATIVE
BASOPHILS # BLD AUTO: 0.02 THOUSANDS/ΜL (ref 0–0.1)
BASOPHILS NFR BLD AUTO: 1 % (ref 0–1)
BENZODIAZ UR QL: NEGATIVE
BILIRUB SERPL-MCNC: 0.81 MG/DL (ref 0.2–1)
BUN SERPL-MCNC: 12 MG/DL (ref 5–25)
CALCIUM SERPL-MCNC: 8.7 MG/DL (ref 8.3–10.1)
CHLORIDE SERPL-SCNC: 95 MMOL/L (ref 100–108)
CO2 SERPL-SCNC: 24 MMOL/L (ref 21–32)
COCAINE UR QL: NEGATIVE
CREAT SERPL-MCNC: 0.85 MG/DL (ref 0.6–1.3)
EOSINOPHIL # BLD AUTO: 0.07 THOUSAND/ΜL (ref 0–0.61)
EOSINOPHIL NFR BLD AUTO: 2 % (ref 0–6)
ERYTHROCYTE [DISTWIDTH] IN BLOOD BY AUTOMATED COUNT: 14 % (ref 11.6–15.1)
ETHANOL SERPL-MCNC: 119 MG/DL (ref 0–3)
ETHANOL SERPL-MCNC: 254 MG/DL (ref 0–3)
GFR SERPL CREATININE-BSD FRML MDRD: 102 ML/MIN/1.73SQ M
GLUCOSE SERPL-MCNC: 201 MG/DL (ref 65–140)
HCT VFR BLD AUTO: 41.9 % (ref 36.5–49.3)
HGB BLD-MCNC: 14.9 G/DL (ref 12–17)
LYMPHOCYTES # BLD AUTO: 1.06 THOUSANDS/ΜL (ref 0.6–4.47)
LYMPHOCYTES NFR BLD AUTO: 26 % (ref 14–44)
MCH RBC QN AUTO: 30.2 PG (ref 26.8–34.3)
MCHC RBC AUTO-ENTMCNC: 35.6 G/DL (ref 31.4–37.4)
MCV RBC AUTO: 85 FL (ref 82–98)
METHADONE UR QL: NEGATIVE
MONOCYTES # BLD AUTO: 0.59 THOUSAND/ΜL (ref 0.17–1.22)
MONOCYTES NFR BLD AUTO: 15 % (ref 4–12)
NEUTROPHILS # BLD AUTO: 2.27 THOUSANDS/ΜL (ref 1.85–7.62)
NEUTS SEG NFR BLD AUTO: 57 % (ref 43–75)
NRBC BLD AUTO-RTO: 0 /100 WBCS
OPIATES UR QL SCN: NEGATIVE
P AXIS: 64 DEGREES
PCP UR QL: NEGATIVE
POTASSIUM SERPL-SCNC: 3.5 MMOL/L (ref 3.5–5.3)
PR INTERVAL: 168 MS
PROT SERPL-MCNC: 7.6 G/DL (ref 6.4–8.2)
QRS AXIS: 64 DEGREES
QRSD INTERVAL: 94 MS
QT INTERVAL: 360 MS
QTC INTERVAL: 440 MS
RBC # BLD AUTO: 4.93 MILLION/UL (ref 3.88–5.62)
SODIUM SERPL-SCNC: 135 MMOL/L (ref 136–145)
T WAVE AXIS: 26 DEGREES
THC UR QL: NEGATIVE
VENTRICULAR RATE: 90 BPM
WBC # BLD AUTO: 4.01 THOUSAND/UL (ref 4.31–10.16)

## 2018-06-16 PROCEDURE — 80320 DRUG SCREEN QUANTALCOHOLS: CPT | Performed by: EMERGENCY MEDICINE

## 2018-06-16 PROCEDURE — 36415 COLL VENOUS BLD VENIPUNCTURE: CPT | Performed by: EMERGENCY MEDICINE

## 2018-06-16 PROCEDURE — 96376 TX/PRO/DX INJ SAME DRUG ADON: CPT

## 2018-06-16 PROCEDURE — 96361 HYDRATE IV INFUSION ADD-ON: CPT

## 2018-06-16 PROCEDURE — 80053 COMPREHEN METABOLIC PANEL: CPT | Performed by: EMERGENCY MEDICINE

## 2018-06-16 PROCEDURE — 85025 COMPLETE CBC W/AUTO DIFF WBC: CPT | Performed by: EMERGENCY MEDICINE

## 2018-06-16 PROCEDURE — 93010 ELECTROCARDIOGRAM REPORT: CPT | Performed by: INTERNAL MEDICINE

## 2018-06-16 PROCEDURE — 80307 DRUG TEST PRSMV CHEM ANLYZR: CPT | Performed by: EMERGENCY MEDICINE

## 2018-06-16 PROCEDURE — 96374 THER/PROPH/DIAG INJ IV PUSH: CPT

## 2018-06-16 PROCEDURE — 93005 ELECTROCARDIOGRAM TRACING: CPT

## 2018-06-16 PROCEDURE — 96375 TX/PRO/DX INJ NEW DRUG ADDON: CPT

## 2018-06-16 RX ORDER — DIAZEPAM 5 MG/ML
5 INJECTION, SOLUTION INTRAMUSCULAR; INTRAVENOUS ONCE
Status: COMPLETED | OUTPATIENT
Start: 2018-06-16 | End: 2018-06-16

## 2018-06-16 RX ORDER — IBUPROFEN 600 MG/1
600 TABLET ORAL ONCE
Status: COMPLETED | OUTPATIENT
Start: 2018-06-16 | End: 2018-06-16

## 2018-06-16 RX ORDER — METOCLOPRAMIDE HYDROCHLORIDE 5 MG/ML
10 INJECTION INTRAMUSCULAR; INTRAVENOUS ONCE
Status: COMPLETED | OUTPATIENT
Start: 2018-06-16 | End: 2018-06-16

## 2018-06-16 RX ORDER — CHLORDIAZEPOXIDE HYDROCHLORIDE 25 MG/1
50 CAPSULE, GELATIN COATED ORAL EVERY 8 HOURS SCHEDULED
Status: DISCONTINUED | OUTPATIENT
Start: 2018-06-16 | End: 2018-06-18 | Stop reason: HOSPADM

## 2018-06-16 RX ADMIN — SODIUM CHLORIDE 1000 ML: 0.9 INJECTION, SOLUTION INTRAVENOUS at 02:13

## 2018-06-16 RX ADMIN — Medication 5 MG: at 12:26

## 2018-06-16 RX ADMIN — IBUPROFEN 600 MG: 600 TABLET ORAL at 20:25

## 2018-06-16 RX ADMIN — METOCLOPRAMIDE 10 MG: 5 INJECTION, SOLUTION INTRAMUSCULAR; INTRAVENOUS at 14:31

## 2018-06-16 RX ADMIN — CHLORDIAZEPOXIDE HYDROCHLORIDE 50 MG: 25 CAPSULE ORAL at 17:53

## 2018-06-16 RX ADMIN — CHLORDIAZEPOXIDE HYDROCHLORIDE 50 MG: 25 CAPSULE ORAL at 21:53

## 2018-06-16 RX ADMIN — METOCLOPRAMIDE 10 MG: 5 INJECTION, SOLUTION INTRAMUSCULAR; INTRAVENOUS at 12:23

## 2018-06-16 RX ADMIN — Medication 5 MG: at 14:35

## 2018-06-16 NOTE — ED NOTES
Pt reports mild improvement of symptoms after medication administration  Pt continues to have visible tremors and he reports mild nausea without reports of dry heeves/vomiting        Jon Cline RN  06/16/18 1921

## 2018-06-16 NOTE — ED NOTES
Pt looking for Alcohol Detox/Rehab  Host referral done @11:40, both called (voice message was left) and sent an email

## 2018-06-16 NOTE — ED NOTES
Pt currently sleeping and appears comfortable  NAD noted  No tremors noted        Delgado Bhagat RN  06/16/18 4756

## 2018-06-16 NOTE — ED PROVIDER NOTES
History  Chief Complaint   Patient presents with    Withdrawal - Alcohol     pt states he is going through withdrawl, reports "shakes" and irritability  denies hallucinations  recently got out of rehab, states he started drinking again last week  last drink approx 1 hour ago  48-year-old male presents for a evaluation for possible alcohol rehabilitation  Patient states that he is an alcoholic who recently relapsed  He describes previously having "for months sober" prior to relapsing and resuming alcohol use about a week ago  The patient states that he has been living in a recovery house but has been "sneaking" alcohol for the past week  Today he drank alcohol heavily  States that he was drinking vodka most of the day followed by beer  The patient is here for evaluation for possible rehab  States that he is concerned that he may withdrawal because he has had withdrawal symptoms in the past   Patient complains of feeling "shaky," however he is exhibiting no objective findings of alcohol withdrawal during my evaluation  States that his relapse was triggered by a separation from his wife in the fact that she recently told him that she would like a divorce  Patient is currently not working  Patient reports that he is somewhat upset over his life situation but denies any suicidal or homicidal ideations  Denies any auditory or visual hallucinations  Denies radiation of symptoms  No other modifying factors          History provided by:  Patient   used: No    Alcohol Problem   Location:  Generalized  Quality:  I am an alcoholic and I relapsed  Severity:  Moderate  Onset quality:  Gradual  Duration:  1 week  Timing:  Constant  Progression:  Unchanged  Chronicity:  Recurrent  Context:  Reason separation from his wife  Relieved by:  Nothing  Worsened by:  Life stressors  Ineffective treatments:  I was living in a recovery house  Associated symptoms: no abdominal pain, no chest pain, no cough, no diarrhea, no fatigue, no fever, no headaches, no nausea, no rash, no shortness of breath, no sore throat and no vomiting        None       Past Medical History:   Diagnosis Date    Alcohol abuse     Alcohol withdrawal seizure ([address] )     Insomnia     Psychiatric disorder        Past Surgical History:   Procedure Laterality Date    SKIN GRAFT      WISDOM TOOTH EXTRACTION         No family history on file  I have reviewed and agree with the history as documented  Social History   Substance Use Topics    Smoking status: Never Smoker    Smokeless tobacco: Never Used    Alcohol use Yes      Comment: 5th whiskey daily         Review of Systems   Constitutional: Negative for activity change, appetite change, diaphoresis, fatigue and fever  HENT: Negative for sore throat and trouble swallowing  Eyes: Negative for pain and visual disturbance  Respiratory: Negative for cough, chest tightness and shortness of breath  Cardiovascular: Negative for chest pain  Gastrointestinal: Negative for abdominal pain, diarrhea, nausea and vomiting  Endocrine: Negative for polyphagia and polyuria  Genitourinary: Negative for dysuria, flank pain, frequency, hematuria and urgency  Musculoskeletal: Negative for back pain, gait problem, neck pain and neck stiffness  Skin: Negative for color change and rash  Allergic/Immunologic: Negative for immunocompromised state  Neurological: Negative for dizziness, speech difficulty, numbness and headaches  Hematological: Does not bruise/bleed easily  Psychiatric/Behavioral: Negative for confusion and suicidal ideas  Physical Exam  Physical Exam   Constitutional: He is oriented to person, place, and time  He appears well-developed and well-nourished  HENT:   Head: Normocephalic  Eyes: Conjunctivae and EOM are normal  Pupils are equal, round, and reactive to light  No scleral icterus  Neck: Normal range of motion  Neck supple     Cardiovascular: Normal rate and regular rhythm  Pulmonary/Chest: Effort normal and breath sounds normal  No respiratory distress  Abdominal: Soft  Bowel sounds are normal  He exhibits no distension  There is no tenderness  There is no rebound and no guarding  Musculoskeletal: Normal range of motion  He exhibits no tenderness or deformity  Lymphadenopathy:     He has no cervical adenopathy  Neurological: He is alert and oriented to person, place, and time  Coordination normal    Skin: Skin is warm and dry  He is not diaphoretic  Psychiatric: He has a normal mood and affect  Vitals reviewed        Vital Signs  ED Triage Vitals   Temperature Pulse Respirations Blood Pressure SpO2   06/15/18 2332 06/15/18 2332 06/15/18 2332 06/15/18 2332 06/15/18 2332   98 9 °F (37 2 °C) (!) 109 16 152/76 97 %      Temp Source Heart Rate Source Patient Position - Orthostatic VS BP Location FiO2 (%)   06/15/18 2332 06/15/18 2332 06/15/18 2332 06/15/18 2332 --   Temporal Monitor Sitting Right arm       Pain Score       06/16/18 0132       No Pain           Vitals:    06/18/18 0050 06/18/18 0255 06/18/18 0626 06/18/18 0916   BP: 129/90 138/94 (!) 143/101 120/88   Pulse: 72 77 78 76   Patient Position - Orthostatic VS: Lying Lying Lying        Visual Acuity  Visual Acuity      Most Recent Value   L Pupil Size (mm)  3   R Pupil Size (mm)  3          ED Medications  Medications   sodium chloride 0 9 % bolus 1,000 mL (0 mL Intravenous Stopped 6/16/18 0311)   diazepam (VALIUM) injection 5 mg (5 mg Intravenous Given 6/16/18 1226)   metoclopramide (REGLAN) injection 10 mg (10 mg Intravenous Given 6/16/18 1223)   metoclopramide (REGLAN) injection 10 mg (10 mg Intravenous Given 6/16/18 1431)   diazepam (VALIUM) injection 5 mg (5 mg Intravenous Given 6/16/18 1435)   ibuprofen (MOTRIN) tablet 600 mg (600 mg Oral Given 6/16/18 2025)   diazepam (VALIUM) tablet 5 mg (5 mg Oral Given 6/17/18 1200)       Diagnostic Studies  Results Reviewed     Procedure Component Value Units Date/Time    Ethanol [[id number]]  (Abnormal) Collected:  06/16/18 0935    Lab Status:  Final result Specimen:  Blood from Arm, Right Updated:  06/16/18 1007     Ethanol Lvl 119 (H) mg/dL     Rapid drug screen, urine [[id number]]  (Normal) Collected:  06/16/18 0314    Lab Status:  Final result Specimen:  Urine from Urine, Other Updated:  06/16/18 0337     Amph/Meth UR Negative     Barbiturate Ur Negative     Benzodiazepine Urine Negative     Cocaine Urine Negative     Methadone Urine Negative     Opiate Urine Negative     PCP Ur Negative     THC Urine Negative    Narrative:         FOR MEDICAL PURPOSES ONLY  IF CONFIRMATION NEEDED PLEASE CONTACT THE LAB WITHIN 5 DAYS      Drug Screen Cutoff Levels:  AMPHETAMINE/METHAMPHETAMINES  1000 ng/mL  BARBITURATES     200 ng/mL  BENZODIAZEPINES     200 ng/mL  COCAINE      300 ng/mL  METHADONE      300 ng/mL  OPIATES      300 ng/mL  PHENCYCLIDINE     25 ng/mL  THC       50 ng/mL    CBC and differential [[id number]]  (Abnormal) Collected:  06/16/18 0213    Lab Status:  Final result Specimen:  Blood from Arm, Left Updated:  06/16/18 0252     WBC 4 01 (L) Thousand/uL      RBC 4 93 Million/uL      Hemoglobin 14 9 g/dL      Hematocrit 41 9 %      MCV 85 fL      MCH 30 2 pg      MCHC 35 6 g/dL      RDW 14 0 %      Platelets -- Thousands/uL      nRBC 0 /100 WBCs      Neutrophils Relative 57 %      Lymphocytes Relative 26 %      Monocytes Relative 15 (H) %      Eosinophils Relative 2 %      Basophils Relative 1 %      Neutrophils Absolute 2 27 Thousands/µL      Lymphocytes Absolute 1 06 Thousands/µL      Monocytes Absolute 0 59 Thousand/µL      Eosinophils Absolute 0 07 Thousand/µL      Basophils Absolute 0 02 Thousands/µL     Comprehensive metabolic panel [[id number]]  (Abnormal) Collected:  06/16/18 0213    Lab Status:  Final result Specimen:  Blood from Arm, Left Updated:  06/16/18 0241     Sodium 135 (L) mmol/L      Potassium 3 5 mmol/L      Chloride 95 (L) mmol/L CO2 24 mmol/L      Anion Gap 16 (H) mmol/L      BUN 12 mg/dL      Creatinine 0 85 mg/dL      Glucose 201 (H) mg/dL      Calcium 8 7 mg/dL       (H) U/L       (H) U/L      Alkaline Phosphatase 59 U/L      Total Protein 7 6 g/dL      Albumin 4 3 g/dL      Total Bilirubin 0 81 mg/dL      eGFR 102 ml/min/1 73sq m     Narrative:         National Kidney Disease Education Program recommendations are as follows:  GFR calculation is accurate only with a steady state creatinine  Chronic Kidney disease less than 60 ml/min/1 73 sq  meters  Kidney failure less than 15 ml/min/1 73 sq  meters  Ethanol [[id number]]  (Abnormal) Collected:  06/16/18 0224    Lab Status:  Final result Specimen:  Blood from Arm, Left Updated:  06/16/18 0233     Ethanol Lvl 254 (H) mg/dL                  No orders to display              Procedures  ECG 12 Lead Documentation  Date/Time: 6/16/2018 2:01 PM  Performed by: Jv Hernandez  Authorized by: Janee MILLER     ECG reviewed by me, the ED Provider: yes    Rate:     ECG rate assessment: normal    Rhythm:     Rhythm: sinus rhythm    Ectopy:     Ectopy: none    QRS:     QRS axis:  Normal  Conduction:     Conduction: normal    ST segments:     ST segments:  Normal  T waves:     T waves: normal             Phone Contacts  ED Phone Contact    ED Course                               MDM  Number of Diagnoses or Management Options  Alcohol abuse: new and requires workup  Diagnosis management comments: 62-year-old male presents requesting evaluation for alcohol rehabilitation  He has no other complaints  States that he has been drinking heavily most of the day  He reports depression regarding a recent separation from his wife but denies any thoughts of suicidality, homicidality, auditory or visual hallucinations  Medical evaluation for clearance for rehab  Will require evaluation by ED crisis  Signed out to Dr Garry Gallegos         Amount and/or Complexity of Data Reviewed  Clinical lab tests: reviewed and ordered  Review and summarize past medical records: yes  Independent visualization of images, tracings, or specimens: yes      CritCare Time    Disposition  Final diagnoses:   Alcohol abuse     Time reflects when diagnosis was documented in both MDM as applicable and the Disposition within this note     Time User Action Codes Description Comment    6/16/2018  2:24 AM Ford Garcia Add [F10 10] Alcohol abuse       ED Disposition     ED Disposition Condition Comment    Discharge  Lord Stai discharge to home/self care  Condition at discharge: Good        Follow-up Information     Follow up With Specialties Details Why Contact Info Additional [address] [facility] Emergency Department Emergency Medicine Go to If symptoms worsen [address] [facility]  [phone] [place] ED, [address] [place] [address], [address] [facility] [address],  Family Medicine Call in 1 day To make appointment for re-evaluation in 1 week [address] [place] [address]  [phone]             There are no discharge medications for this patient  No discharge procedures on file      ED Provider  Electronically Signed by           Pati Nolasco MD  06/24/18 8541

## 2018-06-16 NOTE — ED NOTES
Pt reports worsening symptoms  Dr Panchito Marcano made aware   Will place orders for librium     Mechelle Bernard, ADELFO  06/16/18 8219

## 2018-06-16 NOTE — ED NOTES
2:30 pm CW received a call from [facility]) who informed me that she saw him and found him eligible for 3A level of care  She is going to [employer] him and work on placement for him  She will call with updates as far as where he will go as she puts the plan together  She also told pt to keep his phone on as well so that she can contact him as the plans unfold  She also informed me that at 4 o'clock there will be another staff member Christian Hodges that will take over  CW told her that I will be here until 7 o'clock

## 2018-06-16 NOTE — ED NOTES
Crisis made aware of repeat alcohol level  Will see pt in about an hour when alcohol is presumably below 80       Micaela Villasenor RN  06/16/18 9312

## 2018-06-16 NOTE — ED RE-EVALUATION NOTE
Patient has been stable overnight  Will have alcohol level repeated at 9:30 a m   Still has not been seen by crisis until alcohol level is under 100       Duane Mar DO  06/16/18 1634

## 2018-06-16 NOTE — ED NOTES
Pt reports improvement in symptoms as described in [facility] post medications  PT requesting to eat  Tray ordered for pt       Tommy Geronimo RN  06/16/18 8861

## 2018-06-16 NOTE — ED NOTES
Provider made aware about patient "convulsing," but patient when assessed noted for light tremors to the upper and lower extremities  Provider aware awaiting orders       Renato De La Garza RN  06/16/18 4194

## 2018-06-16 NOTE — ED RE-EVALUATION NOTE
ED Course as of Jun 18 2319   Sat Jun 16, 2018   1102 CRISIS seeing patient  1220 Patient having obvious tremors  Feels like withdrawal   Will do symptomatic measures              Austen Cristobal MD  06/18/18 9139

## 2018-06-16 NOTE — ED NOTES
Per crisis, pt denies SI however states he does not feel safe to go home   Will consult [facility]     Rain Grover RN  06/16/18 1982

## 2018-06-17 RX ORDER — DIAZEPAM 5 MG/1
5 TABLET ORAL ONCE
Status: COMPLETED | OUTPATIENT
Start: 2018-06-17 | End: 2018-06-17

## 2018-06-17 RX ADMIN — CHLORDIAZEPOXIDE HYDROCHLORIDE 50 MG: 25 CAPSULE ORAL at 13:39

## 2018-06-17 RX ADMIN — CHLORDIAZEPOXIDE HYDROCHLORIDE 50 MG: 25 CAPSULE ORAL at 05:52

## 2018-06-17 RX ADMIN — DIAZEPAM 5 MG: 5 TABLET ORAL at 12:00

## 2018-06-17 RX ADMIN — CHLORDIAZEPOXIDE HYDROCHLORIDE 50 MG: 25 CAPSULE ORAL at 22:44

## 2018-06-17 NOTE — ED NOTES
Patient is resting in bed at this time, states "feeling a little anxious"  Reports medication given previously did help some  Vital signs improving        Stefanie Hadley RN  06/17/18 2852

## 2018-06-17 NOTE — ED NOTES
Patient noted to be shaky , having tremors at this time  MD made aware   Meds ordered     Olivier Heredia, ADELFO  06/17/18 5352

## 2018-06-17 NOTE — ED NOTES
Patient agreeable to order sandwich at this time   Order placed for patient     Maxine Stanford, ADELFO  06/17/18 5158

## 2018-06-17 NOTE — ED NOTES
Attempted to contact [facility] program at this time to obtain information about placement of patient, with no answer  Call was forwarded to answering machine stating that it is beyond hours of operation       Rahul Yang RN  06/17/18 0640

## 2018-06-17 NOTE — ED NOTES
Informed by ED unit clerk that [facility] program called to inform ED providers that due to patient's insurance starting yesterday, they are still working on finding him placement       Karie Wallace, RN  06/16/18 6093

## 2018-06-17 NOTE — ED NOTES
Pharmacy called to request dose of Librium, pharmacy states that they will send medication       Arik Jones RN  06/17/18 9496

## 2018-06-17 NOTE — ED NOTES
Spoke with Paulette Turner from [address] [place] [address] reports that the insurance went through for patient , they are hopeful to find a patient a bed tonight or tomorrow  Per Paulette Turner calls out for beds  Asking if patient would be agreeable to go to [facility]  RN asks patient but patient states that he has 4 kids in the area and doesn't think that that [facility]  would be a good placement for him  Paulette Turner aware   Will continue bed search     Delilah Jeans, RN  06/17/18 3635

## 2018-06-17 NOTE — ED NOTES
Patient is asleep at this time  Vital signs stable  Awakens easily to verbal stimuli  Regular, non labored respirations noted        Manoj Irving, RN  06/17/18 3803

## 2018-06-17 NOTE — ED NOTES
Patient independently ambulated to restroom at this time  Steady gait noted, no complaints        Gurmeet Cruz, RN  06/17/18 4871

## 2018-06-17 NOTE — ED NOTES
OJ given to patient at this time   Patient declines food order     Stephanie Davis, ADELFO  06/17/18 8782

## 2018-06-17 NOTE — ED NOTES
Dominick Barboza called from [facility]   Needs to know if patient is agreeable to [address] [place] [address] or [place] for placement   Patient states that he is agreeable to [place] for placement due to location     Gemma Aguilera RN  06/17/18 4649

## 2018-06-18 VITALS
HEART RATE: 76 BPM | RESPIRATION RATE: 16 BRPM | DIASTOLIC BLOOD PRESSURE: 88 MMHG | BODY MASS INDEX: 28.57 KG/M2 | SYSTOLIC BLOOD PRESSURE: 120 MMHG | WEIGHT: 192.9 LBS | HEIGHT: 69 IN | OXYGEN SATURATION: 98 % | TEMPERATURE: 97.7 F

## 2018-06-18 PROCEDURE — 99285 EMERGENCY DEPT VISIT HI MDM: CPT

## 2018-06-18 RX ADMIN — CHLORDIAZEPOXIDE HYDROCHLORIDE 50 MG: 25 CAPSULE ORAL at 06:26

## 2018-06-18 NOTE — DISCHARGE INSTRUCTIONS
Abuse of Alcohol   WHAT YOU NEED TO KNOW:   · Alcohol abuse is unhealthy drinking behavior  You may drink too much at one time once a week, or continue to drink too much daily  You continue to drink even though it causes problems  The problems can be alcohol related legal problems, or problems with work or relationships with family  · If you drink too much at one time, you are binge drinking  Binge drinking is when you have a large amount of alcohol in a short time  Your blood alcohol concentrations (AALIYAH) goes above 0 08 g/dLlevel during binge drinking  For men, this usually happens with more than 4 drinks in 2 hours  For women, it is more than 3 drinks in 2 hours  A drink is 12 ounces of beer, 4 ounces of wine, or 1½ ounces of liquor  DISCHARGE INSTRUCTIONS:   Call 911 for any of the following:   · You have sudden chest pain or trouble breathing  · You have a seizure or have shaking or trembling  · You were in an accident because of alcohol  Return to the emergency department if:   · You want to harm yourself or others  · You have hallucinations (you see or hear things that are not real)  · You cannot stop vomiting or you vomit blood  Contact your healthcare provider if:   · You need help to stop drinking alcohol  · You have questions or concerns about your condition or care  Medicines:   · Vitamin supplements  may be given to treat low vitamin levels  Alcohol can make it hard for your body to absorb enough vitamins such as B1  Vitamin supplements may also be given to prevent alcohol related brain damage  · Take your medicine as directed  Contact your healthcare provider if you think your medicine is not helping or if you have side effects  Tell him or her if you are allergic to any medicine  Keep a list of the medicines, vitamins, and herbs you take  Include the amounts, and when and why you take them  Bring the list or the pill bottles to follow-up visits   Carry your medicine list with you in case of an emergency  Treatments or therapies you may need:   · Detoxification (detox) and withdrawal  is a program that helps you to safely get alcohol out of your body  Detox can also help get rid of the physical need to drink  Healthcare providers monitor the physical symptoms of withdrawal  They may give you medicines to help decrease nausea, dehydration, and seizures  Healthcare providers will also monitor your blood pressure, heart and breathing rates, and your temperature  Symptoms of anxiety, depression, and suicidal thoughts are also monitored and managed during detox  Healthcare providers may give you medicines for these symptoms and therapy sessions will be available to you  Detox is usually done at a detox center or in a hospital  Healthcare providers do not recommend that you try to detox at home or by yourself  Withdrawal symptoms may become life-threatening  The center can help you find 12 step programs or an individual therapist to help with emotional support after detox  · Inpatient and outpatient treatment  focus on your personal needs to help you stop drinking  Treatment helps you understand the reasons you abuse alcohol  Counselors and therapists provide you with support and help you find ways to cope instead of drinking  You may need inpatient treatment to provide a controlled environment  You may need outpatient treatment after your inpatient treatment is complete  · Alcohol aversion therapy  takes away the desire to drink by causing a negative reaction when you drink  Healthcare providers may give you medicines that cause nausea and vomiting when you drink alcohol  They may instead give you a medicine that decreases your urge to drink alcohol  These medicines are used to help you stop drinking or reduce the amount you drink  They can also help you avoid relapse    Follow up with your healthcare provider as directed:  Write down your questions so you remember to ask them during your visits  Avoid alcohol:  You should stop drinking entirely  Alcohol can damage your brain, heart, and liver  It also increases your risk for injury, high blood pressure, and certain types of cancer  Alcohol is dangerous when you combine it with certain medicines  Do not drive if you have had alcohol:  Make sure someone who has not been drinking can help you get home  Get support:  Most people need support to stop drinking alcohol  Mental health providers, support groups, rehabilitation centers, and your healthcare provider can provide support  For more information:   · Alcoholics Anonymous  Web Address: http://Xecced/  · Substance Abuse and [address] , [address]  Web Address: https://Valchemy/  © 2017 [address] Information is for End User's use only and may not be sold, redistributed or otherwise used for commercial purposes  All illustrations and images included in CareNotes® are the copyrighted property of A D A M , Inc  or [employer]  The above information is an  only  It is not intended as medical advice for individual conditions or treatments  Talk to your doctor, nurse or pharmacist before following any medical regimen to see if it is safe and effective for you  Abuse of Alcohol   WHAT YOU SHOULD KNOW:   Alcohol abuse is when you drink large amounts of alcohol often to change your mood or behavior  CARE AGREEMENT:   You have the right to help plan your care  Learn about your health condition and how it may be treated  Discuss treatment options with your caregivers to decide what care you want to receive  You always have the right to refuse treatment  RISKS:   Medicines to treat alcohol abuse may cause vomiting, stress, anxiety, headaches, or dizziness  Alcohol abuse puts you at risk for disease and injury  Alcohol can damage your brain, heart, kidneys, lungs, and liver   The risk of stroke is greater if you have 5 or more drinks each day  You may act out violently when you abuse alcohol  You may harm yourself and others  Risky sexual behavior could lead to sexually transmitted infections  If you are pregnant and drink alcohol, you and your baby are at risk for serious health problems  Alcohol abuse may put you in a coma and may be life-threatening  WHILE YOU ARE HERE:   Informed consent  is a legal document that explains the tests, treatments, or procedures that you may need  Informed consent means you understand what will be done and can make decisions about what you want  You give your permission when you sign the consent form  You can have someone sign this form for you if you are not able to sign it  You have the right to understand your medical care in words you know  Before you sign the consent form, understand the risks and benefits of what will be done  Make sure all your questions are answered  Psychiatric assessment:  Caregivers will ask if you have a history of psychological trauma, such as physical, sexual, or mental abuse  They will ask if you were given the care that you needed  Caregivers will ask you if you have been a victim of a crime or natural disaster, or if you have a serious injury or disease  They will ask you if you have seen other people being harmed, such as in combat  You will be asked if you drink alcohol or use drugs at present or in the past  Caregivers will ask you if you want to hurt or kill yourself or others  How you answer these questions can help caregivers decide on treatment  To help during treatment, caregivers will ask you about such things as how you feel about it and your hobbies and goals  Caregivers will also ask you about the people in your life who support you  Pulse oximeter: A pulse oximeter is a device that measures the amount of oxygen in your blood  A cord with a clip or sticky strip is placed on your finger, ear, or toe   The other end of the cord is hooked to a machine  Never turn the pulse oximeter or alarm off  An alarm will sound if your oxygen level is low or cannot be read  Vital signs:  Caregivers will check your blood pressure, heart rate, breathing rate, and temperature  They will also ask about your pain  These vital signs give caregivers information about your current health  Intake and Output:  Healthcare providers will keep track of the amount of liquid you are getting  They may also need to know how much you are urinating  Ask your healthcare provider how much liquid you should have each day  You may need to increase or decrease the amount of liquid you have each day  Ask healthcare providers if they need to measure or collect your urine before you dispose of it  An IV  is a small tube placed in your vein that is used to give you medicine or liquids  Medicines:   · Sedative: This medicine is given to help you stay calm and relaxed  · Anticonvulsant medicine: This medicine is given to control seizures  Take this medicine exactly as directed  · Antinausea medicine: This medicine may be given to calm your stomach and prevent vomiting  · Glucose: This medicine may be given to increase the amount of sugar in your blood  · Vitamin supplement:  Alcohol can make it hard for your body to absorb enough vitamin B1  You may be given vitamin B1 if you have low levels  It is also given to prevent alcohol related brain damage  You may also need other vitamin supplements  Tests:   · Blood and urine tests:  Samples of your blood or urine are tested for alcohol  Tests can also show signs of liver, kidney, or heart damage caused by alcohol  You may need to have these tests more than once  · Neurologic exam:  This is also called neuro signs, neuro checks, or neuro status  A neurologic exam can show caregivers how well your brain works after an injury or illness   Caregivers will check how your pupils (black dots in the center of each eye) react to light  They may check your memory and how easily you wake up  Your hand grasp and balance may also be tested  · EKG: This test records the electrical activity of your heart  It will be used to check for damage or problems caused by alcohol  · Chest x-ray: This is a picture of your lungs and heart  Healthcare providers may use the x-ray to look for heart damage, injuries, or signs of infection, such as pneumonia  · CT scan: This test is also called a CAT scan  An x-ray machine uses a computer to take pictures of your brain  The pictures may show damage caused by alcohol abuse  You may be given a dye before the pictures are taken to help healthcare providers see the pictures better  Tell the healthcare provider if you have ever had an allergic reaction to contrast dye  Treatment:   · Brief intervention therapy:  A healthcare provider meets with you to discuss ways to control your risky behaviors, such as drinking and driving  This therapy also helps you set goals to decrease the amount of alcohol you drink  · Breathing support:      ¨ You may need extra oxygen  if your blood oxygen level is lower than it should be  You may get oxygen through a mask placed over your nose and mouth or through small tubes placed in your nostrils  Ask your healthcare provider before you take off the mask or oxygen tubing  ¨ A ventilator  is a machine that gives you oxygen and breathes for you when you cannot breathe well on your own  An endotracheal (ET) tube is put into your mouth or nose and attached to the ventilator  You may need a trach if an ET tube cannot be placed  A trach is a tube put through an incision and into your windpipe  © 2014 [address] is for End User's use only and may not be sold, redistributed or otherwise used for commercial purposes   All illustrations and images included in CareNotes® are the copyrighted property of [employer] , Inc  or Louis Tello  The above information is an  only  It is not intended as medical advice for individual conditions or treatments  Talk to your doctor, nurse or pharmacist before following any medical regimen to see if it is safe and effective for you

## 2018-06-18 NOTE — ED NOTES
Crisis worker attempted to continue bed search  Called [facility] but they were closed for the evening  [address] because they were mentioned in the last note with information from [facility]  Neither had a referral for this patient that they were able to locate  Because notes indicated [facility] representative said that they had gotten insurance approval, crisis worker called [facility] to determine patient's insurance provider, and then called [facility] as [facility] indicated was the provider  Spoke with a representative named Emory Koch, who checked their records and stated that the patient did not have [facility], but instead, had [facility]  Crisis worker asked Emory Koch for the contact number to the provider she said covered the patient to ensure it wasn't [facility], and Emory Koch provided the number for [facility] ([phone])  Crisis worker called [facility] and spoke with a representative named Randal Morales, who searched their records trying to determine if a precert for inpatient detox had been completed for this patient  Randal Morales said the patient did not have coverage through them, and she said their records did not indicate if he had coverage with another provider or not  Crisis worker is unable to obtain or verify insurance authorization for inpatient detox at this time  Therefore,  it is in the patient's best interest if [facility] is contacted tomorrow when they open in order to get clarification about whether insurance authorization for inpatient detox has been obtained, and which insurance provider did the authorization and actually provides the coverage, as well as whether a referral has already been sent to a treatment provider

## 2018-06-18 NOTE — ED NOTES
Patient provided with ice water, offered snack and patient declines        Manoj Irving, RN  06/18/18 0833

## 2018-06-18 NOTE — ED NOTES
Faxed referral to [facility] [phone] as requested by Brook Rosas from [facility] due to patient being at the hospital for 60+ hrs  If patient is not accepted patient will be discharged to a shelter

## 2018-06-18 NOTE — ED NOTES
Spoke to [facility] [employer] worker whom stated they have been working on the patient with no luck for bed availability  [facility] will be contacted if their is a bed available right away and waiting a return call  Patient will be discharged if there is no bed  Patient is not SI/HI and has not had any withdrawal symptomes since yesterday

## 2018-06-18 NOTE — ED NOTES
Pt ambulatory out of department with steady gait  Offers no complaints  Explained again to patient that [facility] will give him a call if a bed were to open up  Pt cooperative and understanding        Duke Taylor RN  06/18/18 0057

## 2018-06-18 NOTE — ED CARE HANDOFF
Emergency Department Sign Out Note        Sign out and transfer of care from [facility]  See Separate Emergency Department note  The patient, Mily Ruth, was evaluated by the previous provider for alcohol abuse/withdrawal     Workup Completed:  Observation, crisis evaluation    ED Course / Workup Pending (followup):  F/U PCP and through crisis  Patient has not had any medication for alcohol withdrawal since yesterday at noon and his vital signs are perfectly stable  Procedures  MDM  CritCare Time      Disposition  Final diagnoses:   Alcohol abuse     Time reflects when diagnosis was documented in both MDM as applicable and the Disposition within this note     Time User Action Codes Description Comment    6/16/2018  2:24 AM Juan R Mckeon Add [F10 10] Alcohol abuse       ED Disposition     ED Disposition Condition Comment    Discharge  Mily Ruth discharge to home/self care  Condition at discharge: Good        Follow-up Information     Follow up With Specialties Details Why Contact Info Additional [address] [facility] Emergency Department Emergency Medicine Go to If symptoms worsen [address] [place] [address]  [phone] [place] ED, [address] [facility]  , [place], [address] [facility], [address],  Family Medicine Call in 1 day To make appointment for re-evaluation in 1 week [facility]  [phone]           Patient's Medications   Discharge Prescriptions    No medications on file     No discharge procedures on file         ED Provider  Electronically Signed by     Brooke Barillas MD  06/18/18 Davian Wade MD  06/18/18 7287

## 2018-06-18 NOTE — ED NOTES
Fan Marquis from [facility] will return call after she completes review of patient for placement

## 2018-06-18 NOTE — ED NOTES
Fan Marquis spoke to Nurse Nazanin Govea and is providing medical update to their nurse at [facility] will call back in approx 20 mins

## 2018-06-18 NOTE — ED NOTES
Patient was residing at [facility]  Crisis worker attempted to make contact [phone]

## 2018-06-18 NOTE — ED NOTES
Breakfast tray ordered for patient    Patient denies nausea/tremors/pain     Ronak Purcell RN  06/18/18 [address], [place]  06/18/18 0454

## 2018-06-18 NOTE — ED NOTES
Patient was denied at [facility] due to being on librium and needed to be tapered off, which the hospital does not taper  [facility] stated the currently have no beds to facilitate patient at this time  [facility] was informed of this  Patient given information about local shelters and understands why he is being discharged from the ED

## 2018-06-18 NOTE — ED NOTES
Patient independently ambulated to restroom at this time  Steady gait noted        Manoj Irving, RN  06/17/18 2018

## 2018-06-18 NOTE — ED NOTES
Patient is asleep at this time  Awakens easily to verbal stimuli  No complaints  Vital signs remain stable        Manoj Irving, RN  06/18/18 5985

## 2018-06-18 NOTE — ED NOTES
Patient ambulated to restroom independently  Steady gait noted        Manoj Irving, RN  06/17/18 8885

## 2018-08-16 ENCOUNTER — HOSPITAL ENCOUNTER (EMERGENCY)
Facility: HOSPITAL | Age: 48
Discharge: HOME | End: 2018-08-16
Attending: EMERGENCY MEDICINE
Payer: COMMERCIAL

## 2018-08-16 ENCOUNTER — APPOINTMENT (EMERGENCY)
Dept: RADIOLOGY | Facility: HOSPITAL | Age: 48
End: 2018-08-16
Payer: COMMERCIAL

## 2018-08-16 VITALS
BODY MASS INDEX: 28.65 KG/M2 | SYSTOLIC BLOOD PRESSURE: 140 MMHG | RESPIRATION RATE: 16 BRPM | WEIGHT: 194 LBS | TEMPERATURE: 97.3 F | OXYGEN SATURATION: 98 % | HEART RATE: 68 BPM | DIASTOLIC BLOOD PRESSURE: 78 MMHG

## 2018-08-16 DIAGNOSIS — M10.9 GOUT: Primary | ICD-10-CM

## 2018-08-16 PROCEDURE — 99283 EMERGENCY DEPT VISIT LOW MDM: CPT

## 2018-08-16 PROCEDURE — 73630 X-RAY EXAM OF FOOT: CPT

## 2018-08-16 RX ORDER — COLCHICINE 0.6 MG/1
TABLET ORAL
Qty: 3 TABLET | Refills: 0 | Status: SHIPPED | OUTPATIENT
Start: 2018-08-16

## 2018-08-16 RX ORDER — INDOMETHACIN 50 MG/1
50 CAPSULE ORAL 2 TIMES DAILY WITH MEALS
Qty: 20 CAPSULE | Refills: 0 | Status: SHIPPED | OUTPATIENT
Start: 2018-08-16

## 2018-08-16 NOTE — ED PROVIDER NOTES
History  Chief Complaint   Patient presents with    Foot Pain     woke up with pain to rt  foot yesterday - no injury      This is a 79-year-old male patient who stated last night he started with pain swelling and allodynia to the right lateral aspect of his foot over the 5th metatarsal without any type of trauma  He describes it is being painful and that the sheets irritated  He is walking on crutches  He denies any trauma no fever no chills no break in the skin  Nothing makes his area better or worse  He has tried Motrin over-the-counter  It has helped the pain but is not decreased swelling he has placed ice over this area without improvement  He has never had this before  He is wondering if he has gout because he eats a rich diet however it is not spot  Differential diagnosis includes not limited to gout/pseudogout, cellulitis, trauma less likely  There is no area of fluctuation to drop fluids look for crystals  None       Past Medical History:   Diagnosis Date    Alcohol abuse     Alcohol withdrawal seizure ([address] )     Insomnia     Psychiatric disorder        Past Surgical History:   Procedure Laterality Date    SKIN GRAFT      WISDOM TOOTH EXTRACTION         History reviewed  No pertinent family history  I have reviewed and agree with the history as documented  Social History   Substance Use Topics    Smoking status: Never Smoker    Smokeless tobacco: Never Used    Alcohol use No      Comment: stopped drinking 2 months ago         Review of Systems   All other systems reviewed and are negative  Physical Exam  Physical Exam   Constitutional: He appears well-developed and well-nourished  HENT:   Head: Normocephalic and atraumatic  Right Ear: External ear normal    Left Ear: External ear normal    Nose: Nose normal    Mouth/Throat: Oropharynx is clear and moist    Eyes: Conjunctivae are normal  Pupils are equal, round, and reactive to light     Neck: Normal range of motion  Neck supple  Cardiovascular: Normal rate and regular rhythm  Pulmonary/Chest: Effort normal and breath sounds normal    Abdominal: Soft  Bowel sounds are normal  There is no tenderness  Musculoskeletal:        Feet:    Neurological: He is alert  Skin: Skin is warm  Psychiatric: He has a normal mood and affect  His behavior is normal    Nursing note and vitals reviewed  Vital Signs  ED Triage Vitals [08/16/18 0824]   Temperature Pulse Respirations Blood Pressure SpO2   (!) 97 3 °F (36 3 °C) 68 16 140/78 98 %      Temp Source Heart Rate Source Patient Position - Orthostatic VS BP Location FiO2 (%)   Temporal Monitor Sitting Left arm --      Pain Score       --           Vitals:    08/16/18 0824   BP: 140/78   Pulse: 68   Patient Position - Orthostatic VS: Sitting       Visual Acuity      ED Medications  Medications - No data to display    Diagnostic Studies  Results Reviewed     None                 XR foot 3+ views RIGHT   ED Interpretation by Suly Romano PA-C (08/16 0917)   nad                 Procedures  Procedures       Phone Contacts  ED Phone Contact    ED Course                               MDM  CritCare Time    Disposition  Final diagnoses:   Gout     Time reflects when diagnosis was documented in both MDM as applicable and the Disposition within this note     Time User Action Codes Description Comment    8/16/2018  9:25 AM Christian Betts Add [M10 9] Gout       ED Disposition     ED Disposition Condition Comment    Discharge  Yesica May discharge to home/self care      Condition at discharge: Good        Follow-up Information     Follow up With Specialties Details Why [address], DO Family Medicine Schedule an appointment as soon as possible for a visit  [facility] [address]  [phone]            Patient's Medications   Discharge Prescriptions    COLCHICINE (COLCRYS) 0 6 MG TABLET    1 2mg po x1, then 0 6 mg 1 hr later Start Date: 8/16/2018 End Date: --       Order Dose: --       Quantity: 3 tablet    Refills: 0    INDOMETHACIN (INDOCIN) 50 MG CAPSULE    Take 1 capsule (50 mg total) by mouth 2 (two) times a day with meals       Start Date: 8/16/2018 End Date: --       Order Dose: 50 mg       Quantity: 20 capsule    Refills: 0     No discharge procedures on file      ED Provider  Electronically Signed by           Brian Ga PA-C  08/16/18 6840